# Patient Record
Sex: FEMALE | Race: WHITE | Employment: OTHER | URBAN - NONMETROPOLITAN AREA
[De-identification: names, ages, dates, MRNs, and addresses within clinical notes are randomized per-mention and may not be internally consistent; named-entity substitution may affect disease eponyms.]

---

## 2017-10-20 ENCOUNTER — NURSE TRIAGE (OUTPATIENT)
Dept: ADMINISTRATIVE | Age: 77
End: 2017-10-20

## 2017-10-20 NOTE — TELEPHONE ENCOUNTER
10/20/2017 Grandson call not son? They kept changing it. Annabel Ortiz just got out of hospital after having stent put in.  They told her not to take her eliquis.  She took the eliquise and wants to know what to do. Francesca is the pt. Having been discharge after stent placement, having accidentally taken her elequis tonight. Suggested we need a drKayy's input on the issue tonight. Is going to call [facility], try to speak to dr on call, if not able to, is then going to call PCP, dr. Camarillo. No triage    Reason for Disposition   Caller has URGENT medication question about med that PCP prescribed and triager unable to answer question    Answer Assessment - Initial Assessment Questions  1. SYMPTOMS: \"Do you have any symptoms? \"      Yes, took Elequis. 2. SEVERITY: If symptoms are present, ask \"Are they mild, moderate or severe? \"      Was told to hold the medication, and pt.  Accidentally took it    Protocols used: MEDICATION QUESTION CALL-ADULT-

## 2018-02-16 LAB
BUN BLDV-MCNC: 33 MG/DL
CALCIUM SERPL-MCNC: 8.7 MG/DL
CHLORIDE BLD-SCNC: 109 MMOL/L
CO2: 28 MMOL/L
CREAT SERPL-MCNC: 1.82 MG/DL
GFR CALCULATED: 26
GLUCOSE BLD-MCNC: 105 MG/DL
MAGNESIUM: 1.9 MG/DL
POTASSIUM SERPL-SCNC: 3.1 MMOL/L
SODIUM BLD-SCNC: 145 MMOL/L

## 2018-03-23 LAB
C-REACTIVE PROTEIN: 0.5
URIC ACID: 6.4

## 2018-05-16 LAB
BUN BLDV-MCNC: 39 MG/DL
CALCIUM SERPL-MCNC: 8.7 MG/DL
CHLORIDE BLD-SCNC: 104 MMOL/L
CO2: 28 MMOL/L
CREAT SERPL-MCNC: 1.68 MG/DL
GFR CALCULATED: 29
GLUCOSE BLD-MCNC: 100 MG/DL
POTASSIUM SERPL-SCNC: 4 MMOL/L
SODIUM BLD-SCNC: 142 MMOL/L

## 2018-10-31 ENCOUNTER — OFFICE VISIT (OUTPATIENT)
Dept: NEPHROLOGY | Age: 78
End: 2018-10-31
Payer: COMMERCIAL

## 2018-10-31 VITALS
BODY MASS INDEX: 28.12 KG/M2 | WEIGHT: 175 LBS | OXYGEN SATURATION: 97 % | HEIGHT: 66 IN | HEART RATE: 76 BPM | RESPIRATION RATE: 18 BRPM | DIASTOLIC BLOOD PRESSURE: 65 MMHG | SYSTOLIC BLOOD PRESSURE: 106 MMHG

## 2018-10-31 DIAGNOSIS — N18.30 CKD (CHRONIC KIDNEY DISEASE), STAGE III (HCC): Primary | ICD-10-CM

## 2018-10-31 PROBLEM — K21.9 ACID REFLUX: Status: ACTIVE | Noted: 2018-10-31

## 2018-10-31 PROBLEM — I10 HYPERTENSION: Status: ACTIVE | Noted: 2018-10-31

## 2018-10-31 PROBLEM — C50.919 MALIGNANT NEOPLASM OF FEMALE BREAST (HCC): Status: ACTIVE | Noted: 2018-10-31

## 2018-10-31 PROBLEM — I48.91 ATRIAL FIBRILLATION (HCC): Status: ACTIVE | Noted: 2018-10-31

## 2018-10-31 PROCEDURE — 1090F PRES/ABSN URINE INCON ASSESS: CPT | Performed by: INTERNAL MEDICINE

## 2018-10-31 PROCEDURE — 99203 OFFICE O/P NEW LOW 30 MIN: CPT | Performed by: INTERNAL MEDICINE

## 2018-10-31 PROCEDURE — 1036F TOBACCO NON-USER: CPT | Performed by: INTERNAL MEDICINE

## 2018-10-31 PROCEDURE — 1123F ACP DISCUSS/DSCN MKR DOCD: CPT | Performed by: INTERNAL MEDICINE

## 2018-10-31 PROCEDURE — 1101F PT FALLS ASSESS-DOCD LE1/YR: CPT | Performed by: INTERNAL MEDICINE

## 2018-10-31 PROCEDURE — G8419 CALC BMI OUT NRM PARAM NOF/U: HCPCS | Performed by: INTERNAL MEDICINE

## 2018-10-31 PROCEDURE — G8484 FLU IMMUNIZE NO ADMIN: HCPCS | Performed by: INTERNAL MEDICINE

## 2018-10-31 PROCEDURE — G8427 DOCREV CUR MEDS BY ELIG CLIN: HCPCS | Performed by: INTERNAL MEDICINE

## 2018-10-31 PROCEDURE — G8400 PT W/DXA NO RESULTS DOC: HCPCS | Performed by: INTERNAL MEDICINE

## 2018-10-31 PROCEDURE — G8598 ASA/ANTIPLAT THER USED: HCPCS | Performed by: INTERNAL MEDICINE

## 2018-10-31 PROCEDURE — 4040F PNEUMOC VAC/ADMIN/RCVD: CPT | Performed by: INTERNAL MEDICINE

## 2018-10-31 RX ORDER — PANTOPRAZOLE SODIUM 40 MG/1
40 TABLET, DELAYED RELEASE ORAL DAILY
COMMUNITY
End: 2021-08-02 | Stop reason: ALTCHOICE

## 2018-10-31 RX ORDER — PNV NO.95/FERROUS FUM/FOLIC AC 28MG-0.8MG
TABLET ORAL DAILY
COMMUNITY
End: 2020-03-02

## 2018-10-31 RX ORDER — ISOSORBIDE MONONITRATE 30 MG/1
30 TABLET, EXTENDED RELEASE ORAL DAILY
COMMUNITY

## 2018-10-31 RX ORDER — CLOPIDOGREL BISULFATE 75 MG/1
75 TABLET ORAL DAILY
COMMUNITY

## 2018-10-31 RX ORDER — ALLOPURINOL 100 MG/1
100 TABLET ORAL DAILY
COMMUNITY
End: 2019-02-04 | Stop reason: ALTCHOICE

## 2018-10-31 RX ORDER — SUCRALFATE ORAL 1 G/10ML
1 SUSPENSION ORAL 4 TIMES DAILY
COMMUNITY
End: 2020-03-02

## 2018-10-31 RX ORDER — METOPROLOL SUCCINATE 25 MG/1
25 TABLET, EXTENDED RELEASE ORAL DAILY
COMMUNITY

## 2018-10-31 RX ORDER — FUROSEMIDE 20 MG/1
20 TABLET ORAL DAILY
Status: ON HOLD | COMMUNITY
End: 2022-06-28 | Stop reason: HOSPADM

## 2018-10-31 RX ORDER — ATORVASTATIN CALCIUM 10 MG/1
10 TABLET, FILM COATED ORAL EVERY EVENING
COMMUNITY

## 2018-10-31 RX ORDER — HYDROXYCHLOROQUINE SULFATE 200 MG/1
200 TABLET, FILM COATED ORAL 2 TIMES DAILY
COMMUNITY
End: 2020-03-02

## 2018-10-31 RX ORDER — LISINOPRIL 2.5 MG/1
2.5 TABLET ORAL DAILY
COMMUNITY
End: 2022-02-14

## 2019-01-24 LAB
ABSOLUTE BASO #: 0 /CMM (ref 0–200)
ABSOLUTE EOS #: 200 /CMM (ref 0–500)
ABSOLUTE LYMPH #: 1200 /CMM (ref 1000–4800)
ABSOLUTE MONO #: 500 /CMM (ref 0–800)
ABSOLUTE NEUT #: 3800 /CMM (ref 1800–7700)
ANION GAP SERPL CALCULATED.3IONS-SCNC: 9 MMOL/L (ref 4–12)
BASOPHILS RELATIVE PERCENT: 0.6 % (ref 0–2)
BUN BLDV-MCNC: 30 MG/DL (ref 7–20)
CALCIUM SERPL-MCNC: 9.2 MG/DL (ref 8.8–10.5)
CHLORIDE BLD-SCNC: 103 MEQ/L (ref 101–111)
CO2: 28 MEQ/L (ref 21–32)
CREAT SERPL-MCNC: 1.45 MG/DL (ref 0.6–1.3)
CREATININE CLEARANCE: 35
EOSINOPHILS RELATIVE PERCENT: 3 % (ref 0–6)
GLUCOSE: 80 MG/DL (ref 70–110)
HCT VFR BLD CALC: 34.5 % (ref 35–44)
HEMOGLOBIN: 11.4 GM/DL (ref 12–15)
LYMPHOCYTES RELATIVE PERCENT: 21.2 % (ref 15–45)
MCH RBC QN AUTO: 32.2 PG (ref 27.5–33)
MCHC RBC AUTO-ENTMCNC: 33.2 GM/DL (ref 33–36)
MCV RBC AUTO: 97 CU MIC (ref 80–97)
MONOCYTES RELATIVE PERCENT: 8.6 % (ref 2–10)
NEUTROPHILS RELATIVE PERCENT: 66.6 % (ref 40–70)
NUCLEATED RBCS: 0 /100 WBC
PDW BLD-RTO: 13.5 % (ref 12–16)
PLATELET # BLD: 203 TH/CMM (ref 150–400)
POTASSIUM SERPL-SCNC: 4.3 MEQ/L (ref 3.6–5)
RBC # BLD: 3.56 MIL/CMM (ref 4–5.1)
SODIUM BLD-SCNC: 140 MEQ/L (ref 135–145)
WBC # BLD: 5.8 TH/CMM (ref 4.4–10.5)

## 2019-02-04 ENCOUNTER — OFFICE VISIT (OUTPATIENT)
Dept: NEPHROLOGY | Age: 79
End: 2019-02-04
Payer: MEDICARE

## 2019-02-04 VITALS
SYSTOLIC BLOOD PRESSURE: 138 MMHG | BODY MASS INDEX: 28.45 KG/M2 | HEART RATE: 67 BPM | RESPIRATION RATE: 18 BRPM | WEIGHT: 177 LBS | DIASTOLIC BLOOD PRESSURE: 78 MMHG | OXYGEN SATURATION: 98 % | HEIGHT: 66 IN

## 2019-02-04 DIAGNOSIS — N18.30 CKD (CHRONIC KIDNEY DISEASE), STAGE III (HCC): Primary | ICD-10-CM

## 2019-02-04 PROCEDURE — 99213 OFFICE O/P EST LOW 20 MIN: CPT | Performed by: INTERNAL MEDICINE

## 2019-05-23 LAB
ABSOLUTE BASO #: 100 /CMM (ref 0–200)
ABSOLUTE EOS #: 300 /CMM (ref 0–500)
ABSOLUTE LYMPH #: 1100 /CMM (ref 1000–4800)
ABSOLUTE MONO #: 700 /CMM (ref 0–800)
ABSOLUTE NEUT #: 4500 /CMM (ref 1800–7700)
ANION GAP SERPL CALCULATED.3IONS-SCNC: 8 MMOL/L (ref 4–12)
BASOPHILS RELATIVE PERCENT: 0.8 % (ref 0–2)
BUN BLDV-MCNC: 32 MG/DL (ref 7–20)
CALCIUM SERPL-MCNC: 9.1 MG/DL (ref 8.8–10.5)
CHLORIDE BLD-SCNC: 106 MEQ/L (ref 101–111)
CO2: 28 MEQ/L (ref 21–32)
CREAT SERPL-MCNC: 1.4 MG/DL (ref 0.6–1.3)
CREATININE CLEARANCE: 36
EOSINOPHILS RELATIVE PERCENT: 4 % (ref 0–6)
GLUCOSE: 91 MG/DL (ref 70–110)
HCT VFR BLD CALC: 35.9 % (ref 35–44)
HEMOGLOBIN: 11.8 GM/DL (ref 12–15)
LYMPHOCYTES RELATIVE PERCENT: 16.3 % (ref 15–45)
MCH RBC QN AUTO: 31.4 PG (ref 27.5–33)
MCHC RBC AUTO-ENTMCNC: 33 GM/DL (ref 33–36)
MCV RBC AUTO: 95.2 CU MIC (ref 80–97)
MONOCYTES RELATIVE PERCENT: 11.2 % (ref 2–10)
NEUTROPHILS RELATIVE PERCENT: 67.7 % (ref 40–70)
NUCLEATED RBCS: 0.3 /100 WBC
PDW BLD-RTO: 12.9 % (ref 12–16)
PLATELET # BLD: 211 TH/CMM (ref 150–400)
POTASSIUM SERPL-SCNC: 4.6 MEQ/L (ref 3.6–5)
RBC # BLD: 3.78 MIL/CMM (ref 4–5.1)
SODIUM BLD-SCNC: 142 MEQ/L (ref 135–145)
WBC # BLD: 6.6 TH/CMM (ref 4.4–10.5)

## 2019-06-03 ENCOUNTER — OFFICE VISIT (OUTPATIENT)
Dept: NEPHROLOGY | Age: 79
End: 2019-06-03
Payer: MEDICARE

## 2019-06-03 VITALS
DIASTOLIC BLOOD PRESSURE: 78 MMHG | OXYGEN SATURATION: 96 % | WEIGHT: 179 LBS | HEIGHT: 66 IN | SYSTOLIC BLOOD PRESSURE: 132 MMHG | RESPIRATION RATE: 18 BRPM | BODY MASS INDEX: 28.77 KG/M2 | HEART RATE: 66 BPM

## 2019-06-03 DIAGNOSIS — M54.32 SCIATICA OF LEFT SIDE: ICD-10-CM

## 2019-06-03 DIAGNOSIS — N18.30 CKD (CHRONIC KIDNEY DISEASE), STAGE III (HCC): Primary | ICD-10-CM

## 2019-06-03 PROCEDURE — 99213 OFFICE O/P EST LOW 20 MIN: CPT | Performed by: INTERNAL MEDICINE

## 2019-06-03 RX ORDER — PREDNISONE 10 MG/1
10 TABLET ORAL DAILY
Refills: 0 | COMMUNITY
Start: 2019-05-23 | End: 2019-06-19 | Stop reason: SDUPTHER

## 2019-06-03 RX ORDER — ZINC GLUCONATE 50 MG
50 TABLET ORAL DAILY
COMMUNITY
End: 2020-03-02

## 2019-06-03 RX ORDER — METHYLPREDNISOLONE 4 MG/1
TABLET ORAL
Qty: 1 KIT | Refills: 1 | Status: SHIPPED | OUTPATIENT
Start: 2019-06-03 | End: 2019-06-09

## 2019-06-03 NOTE — PROGRESS NOTES
tablet Take 10 mg by mouth every evening      hydroxychloroquine (PLAQUENIL) 200 MG tablet Take 200 mg by mouth 2 times daily      Potassium Chloride Victoria ER (K-DUR PO) Take 10 mEq by mouth daily      sucralfate (CARAFATE) 1 GM/10ML suspension Take 1 g by mouth 4 times daily      Ferrous Sulfate (IRON) 325 (65 Fe) MG TABS Take by mouth 2 times daily      pantoprazole (PROTONIX) 40 MG tablet Take 40 mg by mouth daily      predniSONE (DELTASONE) 10 MG tablet 10 mg daily  0     No current facility-administered medications for this visit. IMPRESSIONS:        Kidney disease: CKD stage IIIA-stable  Anemia: stable  Bone and mineral metabolism: stable  Probable sciatica       PLAN:  1. We discussed the eGFR today. 2. We will continue all current medications without changes. 3. Medrol dosepak  4. We will see the patient back in 0.25 months.               _________________________________  Ivis Bennett.  [facility]      Concepcion Del Rosario MD

## 2019-06-19 ENCOUNTER — OFFICE VISIT (OUTPATIENT)
Dept: NEPHROLOGY | Age: 79
End: 2019-06-19
Payer: MEDICARE

## 2019-06-19 VITALS
RESPIRATION RATE: 18 BRPM | HEART RATE: 87 BPM | OXYGEN SATURATION: 97 % | BODY MASS INDEX: 27.8 KG/M2 | DIASTOLIC BLOOD PRESSURE: 76 MMHG | WEIGHT: 173 LBS | SYSTOLIC BLOOD PRESSURE: 126 MMHG | HEIGHT: 66 IN

## 2019-06-19 DIAGNOSIS — N18.30 CKD (CHRONIC KIDNEY DISEASE), STAGE III (HCC): Primary | ICD-10-CM

## 2019-06-19 PROCEDURE — 99213 OFFICE O/P EST LOW 20 MIN: CPT | Performed by: INTERNAL MEDICINE

## 2019-06-19 RX ORDER — PREDNISONE 1 MG/1
10 TABLET ORAL DAILY
Qty: 60 TABLET | Refills: 0 | Status: SHIPPED | OUTPATIENT
Start: 2019-06-19 | End: 2019-06-29

## 2019-06-19 NOTE — PROGRESS NOTES
[facility]    [address]  [address], [address]  [phone]       Progress Note    6/19/2019 11:29 AM    Pt Name:    Ashia Dubon  YOB: 1940  Primary Care Physician:  Andi Orosco MD       Chief Complaint:   Chief Complaint   Patient presents with    Chronic Kidney Disease     iii    Hypertension        History of Chief Complaint: CKD stage IIIB from HTN and non function of the right kidney. Subjective:  I last saw the patient in clinic 06/03/19. I follow the patient for Chronic Kidney disease stage IIIB. Since our last visit the patient has not been hospitalized. The patient is sleeping well at night with 1-2 times per night nocturia. The patient has a good appetite and is remaining active. The patient denied N/V/C/D/SOB/CP. At our last visit she complained of numbness and weakness of the left leg. She responded nicely to medrol dosepak. Her numbness is returning. Last six eGFR readings:  Lab Results   Component Value Date    LABGLOM 29 05/16/2018    LABGLOM 26 02/16/2018          Objective:  VITALS:  /76 (Site: Right Upper Arm, Position: Sitting, Cuff Size: Small Adult)   Pulse 87   Resp 18   Ht 5' 5.5\" (1.664 m)   Wt 173 lb (78.5 kg)   SpO2 97%   BMI 28.35 kg/m²   Weight:   Wt Readings from Last 3 Encounters:   06/19/19 173 lb (78.5 kg)   06/03/19 179 lb (81.2 kg)   02/04/19 177 lb (80.3 kg)     Body mass index is 28.35 kg/m². Physical examination    General:  Alert and cooperative with exam  HEENT:  Normocephalic. No lesions nor rashes. JATIN. EOMI  Neck:   No JVD and no bruits. Thyroid gland is normal  Lungs:  Breathing easily. No rales nor rhonchi. No cough nor sputum production. Heart[de-identified]            RRR. No murmurs nor rubs. PMI is not enlarged nor displaced. Abdomen:  Soft and non tender. Bowel sounds are active in all four quadrants. Extremities:  No edema  Neurologic:  CN II-XII are intact. No deficits noted. Muscle strength and tone are equal throughout. Skin:                Warm and dry with no rashes. Muscles:         Hand  and leg strength are equal and strong bilaterally.      Lab Data      CBC:   Lab Results   Component Value Date    WBC 6.6 05/23/2019    HGB 11.8 (L) 05/23/2019    HCT 35.9 05/23/2019    MCV 95.2 05/23/2019     05/23/2019     BMP:    Lab Results   Component Value Date     05/23/2019     01/24/2019     05/16/2018    K 4.6 05/23/2019    K 4.3 01/24/2019    K 4.0 05/16/2018     05/23/2019     01/24/2019     05/16/2018    CO2 28 05/23/2019    CO2 28 01/24/2019    CO2 28 05/16/2018    BUN 32 (H) 05/23/2019    BUN 30 (H) 01/24/2019    BUN 39 05/16/2018    CREATININE 1.40 (H) 05/23/2019    CREATININE 1.45 (H) 01/24/2019    CREATININE 1.68 05/16/2018    GLUCOSE 91 05/23/2019    GLUCOSE 80 01/24/2019    GLUCOSE 100 05/16/2018      Hepatic: No results found for: AST, ALT, ALB, BILITOT, ALKPHOS  BNP: No results found for: BNP  Lipids: No results found for: CHOL, HDL  INR: No results found for: INR  URINE: No results found for: NAUR, PROTUR  No results found for: NITRU, COLORU, PHUR, LABCAST, WBCUA, RBCUA, MUCUS, TRICHOMONAS, YEAST, BACTERIA, CLARITYU, SPECGRAV, LEUKOCYTESUR, UROBILINOGEN, BILIRUBINUR, BLOODU, GLUCOSEU, KETUA, AMORPHOUS   Microalbumen/Creatinine ratio:  No components found for: RUCREAT        Medications:    Current Outpatient Medications   Medication Sig Dispense Refill    zinc gluconate 50 MG tablet Take 50 mg by mouth daily      predniSONE (DELTASONE) 10 MG tablet 10 mg daily  0    metoprolol succinate (TOPROL XL) 25 MG extended release tablet Take 25 mg by mouth daily      clopidogrel (PLAVIX) 75 MG tablet Take 75 mg by mouth daily      isosorbide mononitrate (IMDUR) 30 MG extended release tablet Take 30 mg by mouth daily      apixaban (ELIQUIS) 2.5 MG TABS tablet Take 2.5 mg by mouth 2 times daily      furosemide (LASIX) 20 MG tablet Take 20 mg by mouth daily      lisinopril (PRINIVIL;ZESTRIL) 2.5 MG tablet Take 2.5 mg by mouth daily      atorvastatin (LIPITOR) 10 MG tablet Take 10 mg by mouth every evening      hydroxychloroquine (PLAQUENIL) 200 MG tablet Take 200 mg by mouth 2 times daily      Potassium Chloride Victoria ER (K-DUR PO) Take 10 mEq by mouth daily      sucralfate (CARAFATE) 1 GM/10ML suspension Take 1 g by mouth 4 times daily      Ferrous Sulfate (IRON) 325 (65 Fe) MG TABS Take by mouth 2 times daily      pantoprazole (PROTONIX) 40 MG tablet Take 40 mg by mouth daily       No current facility-administered medications for this visit. IMPRESSIONS:        Kidney disease: CKD stage IIIA-stable  Lumbar sciatica  Anemia:  Bone and mineral metabolism:       PLAN:  1. We discussed the eGFR today. 2. We will continue all current medications without changes. 3. Will add prednisone 10 mg oral daily until she is seen by her family physician 06/28/19  4. We will see the patient back in 3 months.               _________________________________  Raina Greenberg.  [facility]      Gina Sheth MD

## 2019-09-10 LAB
ANION GAP SERPL CALCULATED.3IONS-SCNC: 6 MMOL/L (ref 4–12)
BUN BLDV-MCNC: 30 MG/DL (ref 7–20)
CALCIUM SERPL-MCNC: 9.3 MG/DL (ref 8.8–10.5)
CHLORIDE BLD-SCNC: 108 MEQ/L (ref 101–111)
CO2: 29 MEQ/L (ref 21–32)
CREAT SERPL-MCNC: 1.65 MG/DL (ref 0.6–1.3)
CREATININE CLEARANCE: 30
GLUCOSE, FASTING: 93 MG/DL (ref 70–110)
POTASSIUM SERPL-SCNC: 4.2 MEQ/L (ref 3.6–5)
SODIUM BLD-SCNC: 143 MEQ/L (ref 135–145)

## 2019-09-17 LAB
ABSOLUTE BASO #: 100 /CMM (ref 0–200)
ABSOLUTE EOS #: 200 /CMM (ref 0–500)
ABSOLUTE LYMPH #: 900 /CMM (ref 1000–4800)
ABSOLUTE MONO #: 500 /CMM (ref 0–800)
ABSOLUTE NEUT #: 3500 /CMM (ref 1800–7700)
BASOPHILS RELATIVE PERCENT: 1 % (ref 0–2)
EOSINOPHILS RELATIVE PERCENT: 3 % (ref 0–6)
HCT VFR BLD CALC: 35.3 % (ref 35–44)
HEMOGLOBIN: 11.5 GM/DL (ref 12–15)
LYMPHOCYTES RELATIVE PERCENT: 17.1 % (ref 15–45)
MCH RBC QN AUTO: 31 PG (ref 27.5–33)
MCHC RBC AUTO-ENTMCNC: 32.4 GM/DL (ref 33–36)
MCV RBC AUTO: 95.8 CU MIC (ref 80–97)
MONOCYTES RELATIVE PERCENT: 10.5 % (ref 2–10)
NEUTROPHILS RELATIVE PERCENT: 68.4 % (ref 40–70)
NUCLEATED RBCS: 0.2 /100 WBC
PDW BLD-RTO: 13.3 % (ref 12–16)
PLATELET # BLD: 197 TH/CMM (ref 150–400)
RBC # BLD: 3.69 MIL/CMM (ref 4–5.1)
WBC # BLD: 5.2 TH/CMM (ref 4.4–10.5)

## 2019-09-18 ENCOUNTER — OFFICE VISIT (OUTPATIENT)
Dept: NEPHROLOGY | Age: 79
End: 2019-09-18
Payer: MEDICARE

## 2019-09-18 VITALS
DIASTOLIC BLOOD PRESSURE: 68 MMHG | BODY MASS INDEX: 27.32 KG/M2 | RESPIRATION RATE: 18 BRPM | WEIGHT: 170 LBS | HEART RATE: 63 BPM | SYSTOLIC BLOOD PRESSURE: 112 MMHG | OXYGEN SATURATION: 97 % | HEIGHT: 66 IN

## 2019-09-18 DIAGNOSIS — N18.30 CKD (CHRONIC KIDNEY DISEASE), STAGE III (HCC): Primary | ICD-10-CM

## 2019-09-18 PROCEDURE — 99213 OFFICE O/P EST LOW 20 MIN: CPT | Performed by: INTERNAL MEDICINE

## 2019-09-18 RX ORDER — OMEPRAZOLE 40 MG/1
40 CAPSULE, DELAYED RELEASE ORAL DAILY
COMMUNITY
Start: 2017-03-01 | End: 2019-09-18 | Stop reason: ALTCHOICE

## 2019-09-18 NOTE — PROGRESS NOTES
[facility]    [address]  [address], [address]  [phone]       Progress Note    9/18/2019 11:35 AM    Pt Name:    Rene Ashford  YOB: 1940  Primary Care Physician:  Mike Lu MD       Chief Complaint:   Chief Complaint   Patient presents with    Chronic Kidney Disease     iii    Hypertension        History of Chief Complaint: CKD stage IIIB from HTN and non function of the right kidney. Subjective:  I last saw the patient in clinic 06/19/19. I follow the patient for Chronic Kidney disease stage IIIB. Since our last visit the patient has not been hospitalized. The patient is sleeping fairly well at night with 1-2 times per night nocturia. The patient has a good appetite and is remaining active. The patient denied N/V/C/D/SOB/CP. Last six eGFR readings:  Lab Results   Component Value Date    LABGLOM 29 05/16/2018    LABGLOM 26 02/16/2018          Objective:  VITALS:  /68 (Site: Right Upper Arm, Position: Sitting, Cuff Size: Large Adult)   Pulse 63   Resp 18   Ht 5' 5.5\" (1.664 m)   Wt 170 lb (77.1 kg)   SpO2 97%   BMI 27.86 kg/m²   Weight:   Wt Readings from Last 3 Encounters:   09/18/19 170 lb (77.1 kg)   06/19/19 173 lb (78.5 kg)   06/03/19 179 lb (81.2 kg)     Body mass index is 27.86 kg/m². Physical examination    General:  Alert and cooperative with exam  HEENT:  Normocephalic. No lesions nor rashes. JATIN. EOMI  Neck:   No JVD and no bruits. Thyroid gland is normal  Lungs:  Breathing easily. No rales nor rhonchi. No cough nor sputum production. Heart[de-identified]            RRR. No murmurs nor rubs. PMI is not enlarged nor displaced. Abdomen:  Soft and non tender. Bowel sounds are active in all four quadrants. Extremities:  No edema  Neurologic:  CN II-XII are intact. No deficits noted. Muscle strength and tone are equal throughout. Skin:                Warm and dry with no rashes.   Muscles:         Hand  and leg

## 2020-01-08 LAB
ABSOLUTE BASO #: 0 /CMM (ref 0–200)
ABSOLUTE EOS #: 200 /CMM (ref 0–500)
ABSOLUTE LYMPH #: 800 /CMM (ref 1000–4800)
ABSOLUTE MONO #: 500 /CMM (ref 0–800)
ABSOLUTE NEUT #: 4000 /CMM (ref 1800–7700)
BASOPHILS RELATIVE PERCENT: 0.9 % (ref 0–2)
EOSINOPHILS RELATIVE PERCENT: 3.5 % (ref 0–6)
HCT VFR BLD CALC: 31.8 % (ref 35–44)
HEMOGLOBIN: 10.4 GM/DL (ref 12–15)
LYMPHOCYTES RELATIVE PERCENT: 13.9 % (ref 15–45)
MCH RBC QN AUTO: 30.9 PG (ref 27.5–33)
MCHC RBC AUTO-ENTMCNC: 32.8 GM/DL (ref 33–36)
MCV RBC AUTO: 94.2 CU MIC (ref 80–97)
MONOCYTES RELATIVE PERCENT: 9.2 % (ref 2–10)
NEUTROPHILS RELATIVE PERCENT: 72.5 % (ref 40–70)
NUCLEATED RBCS: 0.1 /100 WBC
PDW BLD-RTO: 14.3 % (ref 12–16)
PLATELET # BLD: 260 TH/CMM (ref 150–400)
RBC # BLD: 3.37 MIL/CMM (ref 4–5.1)
WBC # BLD: 5.5 TH/CMM (ref 4.4–10.5)

## 2020-01-09 LAB
ANION GAP SERPL CALCULATED.3IONS-SCNC: 5 MMOL/L (ref 4–12)
BUN BLDV-MCNC: 32 MG/DL (ref 7–20)
CALCIUM SERPL-MCNC: 9.1 MG/DL (ref 8.8–10.5)
CHLORIDE BLD-SCNC: 108 MEQ/L (ref 101–111)
CO2: 31 MEQ/L (ref 21–32)
CREAT SERPL-MCNC: 1.52 MG/DL (ref 0.6–1.3)
CREATININE CLEARANCE: 33
GLUCOSE: 87 MG/DL (ref 70–110)
POTASSIUM SERPL-SCNC: 3.8 MEQ/L (ref 3.6–5)
SODIUM BLD-SCNC: 144 MEQ/L (ref 135–145)

## 2020-01-15 ENCOUNTER — OFFICE VISIT (OUTPATIENT)
Dept: NEPHROLOGY | Age: 80
End: 2020-01-15
Payer: MEDICARE

## 2020-01-15 VITALS
WEIGHT: 171 LBS | SYSTOLIC BLOOD PRESSURE: 148 MMHG | HEART RATE: 72 BPM | DIASTOLIC BLOOD PRESSURE: 78 MMHG | RESPIRATION RATE: 18 BRPM | HEIGHT: 66 IN | OXYGEN SATURATION: 97 % | BODY MASS INDEX: 27.48 KG/M2

## 2020-01-15 PROCEDURE — 1123F ACP DISCUSS/DSCN MKR DOCD: CPT | Performed by: INTERNAL MEDICINE

## 2020-01-15 PROCEDURE — G8417 CALC BMI ABV UP PARAM F/U: HCPCS | Performed by: INTERNAL MEDICINE

## 2020-01-15 PROCEDURE — G8484 FLU IMMUNIZE NO ADMIN: HCPCS | Performed by: INTERNAL MEDICINE

## 2020-01-15 PROCEDURE — 99213 OFFICE O/P EST LOW 20 MIN: CPT | Performed by: INTERNAL MEDICINE

## 2020-01-15 PROCEDURE — 1036F TOBACCO NON-USER: CPT | Performed by: INTERNAL MEDICINE

## 2020-01-15 PROCEDURE — G8427 DOCREV CUR MEDS BY ELIG CLIN: HCPCS | Performed by: INTERNAL MEDICINE

## 2020-01-15 PROCEDURE — 1090F PRES/ABSN URINE INCON ASSESS: CPT | Performed by: INTERNAL MEDICINE

## 2020-01-15 PROCEDURE — 4040F PNEUMOC VAC/ADMIN/RCVD: CPT | Performed by: INTERNAL MEDICINE

## 2020-01-15 PROCEDURE — G8400 PT W/DXA NO RESULTS DOC: HCPCS | Performed by: INTERNAL MEDICINE

## 2020-01-15 RX ORDER — OMEPRAZOLE 40 MG/1
CAPSULE, DELAYED RELEASE ORAL DAILY
COMMUNITY
End: 2020-03-02

## 2020-01-15 RX ORDER — METHYLPREDNISOLONE 4 MG/1
TABLET ORAL
Qty: 1 KIT | Refills: 1 | Status: SHIPPED | OUTPATIENT
Start: 2020-01-15 | End: 2020-01-21

## 2020-01-15 NOTE — PROGRESS NOTES
Warm and dry with no rashes. Muscles:         Hand  and leg strength are equal and strong bilaterally.      Lab Data      CBC:   Lab Results   Component Value Date    WBC 5.5 01/08/2020    HGB 10.4 (L) 01/08/2020    HCT 31.8 (L) 01/08/2020    MCV 94.2 01/08/2020     01/08/2020     BMP:    Lab Results   Component Value Date     01/08/2020     09/10/2019     05/23/2019    K 3.8 01/08/2020    K 4.2 09/10/2019    K 4.6 05/23/2019     01/08/2020     09/10/2019     05/23/2019    CO2 31 01/08/2020    CO2 29 09/10/2019    CO2 28 05/23/2019    BUN 32 (H) 01/08/2020    BUN 30 (H) 09/10/2019    BUN 32 (H) 05/23/2019    CREATININE 1.52 (H) 01/08/2020    CREATININE 1.65 (H) 09/10/2019    CREATININE 1.40 (H) 05/23/2019    GLUCOSE 87 01/08/2020    GLUCOSE 91 05/23/2019    GLUCOSE 80 01/24/2019      Hepatic: No results found for: AST, ALT, ALB, BILITOT, ALKPHOS  BNP: No results found for: BNP  Lipids: No results found for: CHOL, HDL  INR: No results found for: INR  URINE: No results found for: NAUR, PROTUR  No results found for: NITRU, COLORU, PHUR, LABCAST, WBCUA, RBCUA, MUCUS, TRICHOMONAS, YEAST, BACTERIA, CLARITYU, SPECGRAV, LEUKOCYTESUR, UROBILINOGEN, BILIRUBINUR, BLOODU, GLUCOSEU, KETUA, AMORPHOUS   Microalbumen/Creatinine ratio:  No components found for: RUCREAT        Medications:    Current Outpatient Medications   Medication Sig Dispense Refill    omeprazole (PRILOSEC) 40 MG delayed release capsule Take by mouth daily      metoprolol succinate (TOPROL XL) 25 MG extended release tablet Take 25 mg by mouth daily      clopidogrel (PLAVIX) 75 MG tablet Take 75 mg by mouth daily      isosorbide mononitrate (IMDUR) 30 MG extended release tablet Take 30 mg by mouth daily      apixaban (ELIQUIS) 2.5 MG TABS tablet Take 2.5 mg by mouth 2 times daily      furosemide (LASIX) 20 MG tablet Take 20 mg by mouth daily      lisinopril (PRINIVIL;ZESTRIL) 2.5 MG tablet Take 2.5 mg by mouth daily      atorvastatin (LIPITOR) 10 MG tablet Take 10 mg by mouth every evening      Potassium Chloride Victoria ER (K-DUR PO) Take 10 mEq by mouth daily      Ferrous Sulfate (IRON) 325 (65 Fe) MG TABS Take by mouth daily       pantoprazole (PROTONIX) 40 MG tablet Take 40 mg by mouth daily      LACTULOSE PO TAKE 15 MILLILITERS BY MOUTH DAILY AS DIRECTED      zinc gluconate 50 MG tablet Take 50 mg by mouth daily      hydroxychloroquine (PLAQUENIL) 200 MG tablet Take 200 mg by mouth 2 times daily      sucralfate (CARAFATE) 1 GM/10ML suspension Take 1 g by mouth 4 times daily       No current facility-administered medications for this visit. IMPRESSIONS:        Kidney disease: CKD stage IIIB  Migratory joint pain  Anemia: Anemia remains stable. No need for an erythrocyte stimulating agent (KENJI). Bone and mineral metabolism: stable       PLAN:  1. We discussed the eGFR today. 2. We will continue all current medications without changes. 3. Checking GUDELIA and anti DS DNA to screen for lupus. 4. Medrol dose shani for pain  5. We will see the patient back in 1.5 months.               _________________________________  Eliverto Holes.  [facility]      Stan Austin MD

## 2020-02-24 LAB
ABSOLUTE BASO #: 0 /CMM (ref 0–200)
ABSOLUTE EOS #: 100 /CMM (ref 0–500)
ABSOLUTE LYMPH #: 1000 /CMM (ref 1000–4800)
ABSOLUTE MONO #: 500 /CMM (ref 0–800)
ABSOLUTE NEUT #: 5600 /CMM (ref 1800–7700)
ANION GAP SERPL CALCULATED.3IONS-SCNC: 9 MMOL/L (ref 4–12)
BASOPHILS RELATIVE PERCENT: 0.5 % (ref 0–2)
BUN BLDV-MCNC: 35 MG/DL (ref 7–20)
CALCIUM SERPL-MCNC: 9.1 MG/DL (ref 8.8–10.5)
CHLORIDE BLD-SCNC: 105 MEQ/L (ref 101–111)
CO2: 30 MEQ/L (ref 21–32)
CREAT SERPL-MCNC: 1.46 MG/DL (ref 0.6–1.3)
CREATININE CLEARANCE: 34
EOSINOPHILS RELATIVE PERCENT: 1 % (ref 0–6)
GLUCOSE: 106 MG/DL (ref 70–110)
HCT VFR BLD CALC: 35.9 % (ref 35–44)
HEMOGLOBIN: 11.8 GM/DL (ref 12–15)
LYMPHOCYTES RELATIVE PERCENT: 13.3 % (ref 15–45)
MCH RBC QN AUTO: 30.5 PG (ref 27.5–33)
MCHC RBC AUTO-ENTMCNC: 32.8 GM/DL (ref 33–36)
MCV RBC AUTO: 93.2 CU MIC (ref 80–97)
MONOCYTES RELATIVE PERCENT: 7.4 % (ref 2–10)
NEUTROPHILS RELATIVE PERCENT: 77.8 % (ref 40–70)
NUCLEATED RBCS: 0.3 /100 WBC
PDW BLD-RTO: 13.6 % (ref 12–16)
PLATELET # BLD: 276 TH/CMM (ref 150–400)
POTASSIUM SERPL-SCNC: 4 MEQ/L (ref 3.6–5)
RBC # BLD: 3.85 MIL/CMM (ref 4–5.1)
SODIUM BLD-SCNC: 144 MEQ/L (ref 135–145)
WBC # BLD: 7.2 TH/CMM (ref 4.4–10.5)

## 2020-02-26 LAB — DOUBLE STRANDED DNA AB, IGG: <12.3 IU/ML

## 2020-03-01 LAB
ANA PATTERN: NORMAL
ANA SCREEN: POSITIVE
ANA TITER: NORMAL

## 2020-03-02 ENCOUNTER — OFFICE VISIT (OUTPATIENT)
Dept: NEPHROLOGY | Age: 80
End: 2020-03-02
Payer: MEDICARE

## 2020-03-02 VITALS
BODY MASS INDEX: 27.88 KG/M2 | SYSTOLIC BLOOD PRESSURE: 106 MMHG | HEIGHT: 65 IN | HEART RATE: 72 BPM | WEIGHT: 167.3 LBS | DIASTOLIC BLOOD PRESSURE: 70 MMHG

## 2020-03-02 PROCEDURE — G8400 PT W/DXA NO RESULTS DOC: HCPCS | Performed by: INTERNAL MEDICINE

## 2020-03-02 PROCEDURE — G8427 DOCREV CUR MEDS BY ELIG CLIN: HCPCS | Performed by: INTERNAL MEDICINE

## 2020-03-02 PROCEDURE — 1036F TOBACCO NON-USER: CPT | Performed by: INTERNAL MEDICINE

## 2020-03-02 PROCEDURE — 4040F PNEUMOC VAC/ADMIN/RCVD: CPT | Performed by: INTERNAL MEDICINE

## 2020-03-02 PROCEDURE — 1123F ACP DISCUSS/DSCN MKR DOCD: CPT | Performed by: INTERNAL MEDICINE

## 2020-03-02 PROCEDURE — G8417 CALC BMI ABV UP PARAM F/U: HCPCS | Performed by: INTERNAL MEDICINE

## 2020-03-02 PROCEDURE — 99213 OFFICE O/P EST LOW 20 MIN: CPT | Performed by: INTERNAL MEDICINE

## 2020-03-02 PROCEDURE — 1090F PRES/ABSN URINE INCON ASSESS: CPT | Performed by: INTERNAL MEDICINE

## 2020-03-02 PROCEDURE — G8484 FLU IMMUNIZE NO ADMIN: HCPCS | Performed by: INTERNAL MEDICINE

## 2020-03-02 NOTE — PROGRESS NOTES
[facility]    [address]  [address], [address]  [phone]       Progress Note    3/2/2020 2:49 PM    Pt Name:    Godwin Munson:    1940  Primary Care Physician:  Ricardo Franco MD       Chief Complaint:   Chief Complaint   Patient presents with    Follow-up        History of Chief Complaint: CKD stage IIIB from HTN and non function of the right kidney. Subjective:  I last saw the patient in clinic 01/15/20. I follow the patient for Chronic Kidney disease stage IIIB. Since our last visit the patient has not been hospitalized. The patient is sleeping well at night with 1-2 times per night nocturia. The patient has a good appetite and is remaining active. The patient denied N/V/C/D/SOB/CP. Medrol dosepak did help the pain. GUDELIA was 1:160. Anti DS DNA was normal.      Last six eGFR readings:  Lab Results   Component Value Date    LABGLOM 29 05/16/2018    LABGLOM 26 02/16/2018          Objective:  VITALS:  /70 (Site: Right Upper Arm, Position: Sitting, Cuff Size: Medium Adult)   Pulse 72   Ht 5' 5\" (1.651 m)   Wt 167 lb 4.8 oz (75.9 kg)   BMI 27.84 kg/m²   Weight:   Wt Readings from Last 3 Encounters:   03/02/20 167 lb 4.8 oz (75.9 kg)   01/15/20 171 lb (77.6 kg)   09/18/19 170 lb (77.1 kg)     Body mass index is 27.84 kg/m². Physical examination    General:  Alert and cooperative with exam  HEENT:  Normocephalic. No lesions nor rashes. JATIN. EOMI  Neck:   No JVD and no bruits. Thyroid gland is normal  Lungs:  Breathing easily. No rales nor rhonchi. No cough nor sputum production. Heart[de-identified]            RRR. No murmurs nor rubs. PMI is not enlarged nor displaced. Abdomen:  Soft and non tender. Bowel sounds are active in all four quadrants. Extremities:  No edema  Neurologic:  CN II-XII are intact. No deficits noted. Muscle strength and tone are equal throughout. Skin:                Warm and dry with no rashes.   Muscles:         Hand  and leg strength are equal and strong bilaterally.      Lab Data      CBC:   Lab Results   Component Value Date    WBC 7.2 02/24/2020    HGB 11.8 (L) 02/24/2020    HCT 35.9 02/24/2020    MCV 93.2 02/24/2020     02/24/2020     BMP:    Lab Results   Component Value Date     02/24/2020     01/08/2020     09/10/2019    K 4.0 02/24/2020    K 3.8 01/08/2020    K 4.2 09/10/2019     02/24/2020     01/08/2020     09/10/2019    CO2 30 02/24/2020    CO2 31 01/08/2020    CO2 29 09/10/2019    BUN 35 (H) 02/24/2020    BUN 32 (H) 01/08/2020    BUN 30 (H) 09/10/2019    CREATININE 1.46 (H) 02/24/2020    CREATININE 1.52 (H) 01/08/2020    CREATININE 1.65 (H) 09/10/2019    GLUCOSE 106 02/24/2020    GLUCOSE 87 01/08/2020    GLUCOSE 91 05/23/2019      Hepatic: No results found for: AST, ALT, ALB, BILITOT, ALKPHOS  BNP: No results found for: BNP  Lipids: No results found for: CHOL, HDL  INR: No results found for: INR  URINE: No results found for: NAUR, PROTUR  No results found for: NITRU, COLORU, PHUR, LABCAST, WBCUA, RBCUA, MUCUS, TRICHOMONAS, YEAST, BACTERIA, CLARITYU, SPECGRAV, LEUKOCYTESUR, UROBILINOGEN, BILIRUBINUR, BLOODU, GLUCOSEU, KETUA, AMORPHOUS   Microalbumen/Creatinine ratio:  No components found for: RUCREAT        Medications:    Current Outpatient Medications   Medication Sig Dispense Refill    LACTULOSE PO TAKE 15 MILLILITERS BY MOUTH DAILY AS DIRECTED      metoprolol succinate (TOPROL XL) 25 MG extended release tablet Take 25 mg by mouth daily      clopidogrel (PLAVIX) 75 MG tablet Take 75 mg by mouth daily      isosorbide mononitrate (IMDUR) 30 MG extended release tablet Take 30 mg by mouth daily      apixaban (ELIQUIS) 2.5 MG TABS tablet Take 2.5 mg by mouth 2 times daily      furosemide (LASIX) 20 MG tablet Take 20 mg by mouth daily      lisinopril (PRINIVIL;ZESTRIL) 2.5 MG tablet Take 2.5 mg by mouth daily      atorvastatin (LIPITOR) 10 MG tablet Take 10 mg by mouth every evening      pantoprazole (PROTONIX) 40 MG tablet Take 40 mg by mouth daily       No current facility-administered medications for this visit. IMPRESSIONS:        Kidney disease: CKD stage IIIB with improved function. Anemia: Anemia remains stable. No need for an erythrocyte stimulating agent (KENJI). Bone and mineral metabolism: stable       PLAN:  1. We discussed the eGFR today. 2. We will continue all current medications without changes. 3. Would suggest stopping protonix. Continued use of proton pump inhibitors have been shown to slow kidney function. 4. We will see the patient back in 3 months.               _________________________________  Real Riff.  [facility]      Kyle Ramirez MD

## 2020-09-28 LAB
ABSOLUTE BASO #: 100 /CMM (ref 0–200)
ABSOLUTE EOS #: 100 /CMM (ref 0–500)
ABSOLUTE LYMPH #: 1300 /CMM (ref 1000–4800)
ABSOLUTE MONO #: 800 /CMM (ref 0–800)
ABSOLUTE NEUT #: 5400 /CMM (ref 1800–7700)
ANION GAP SERPL CALCULATED.3IONS-SCNC: 8 MMOL/L (ref 4–12)
BASOPHILS RELATIVE PERCENT: 1 % (ref 0–2)
BUN BLDV-MCNC: 31 MG/DL (ref 7–20)
CALCIUM SERPL-MCNC: 9.5 MG/DL (ref 8.8–10.5)
CHLORIDE BLD-SCNC: 107 MEQ/L (ref 101–111)
CO2: 31 MEQ/L (ref 21–32)
CREAT SERPL-MCNC: 1.4 MG/DL (ref 0.6–1.3)
CREATININE CLEARANCE: 36
EOSINOPHILS RELATIVE PERCENT: 1.4 % (ref 0–6)
GLUCOSE, FASTING: 89 MG/DL (ref 70–110)
HCT VFR BLD CALC: 33.6 % (ref 35–44)
HEMOGLOBIN: 11.1 GM/DL (ref 12–15)
LYMPHOCYTES RELATIVE PERCENT: 16.5 % (ref 15–45)
MCH RBC QN AUTO: 30 PG (ref 27.5–33)
MCHC RBC AUTO-ENTMCNC: 33.2 GM/DL (ref 33–36)
MCV RBC AUTO: 90.3 CU MIC (ref 80–97)
MONOCYTES RELATIVE PERCENT: 10.3 % (ref 2–10)
NEUTROPHILS RELATIVE PERCENT: 70.8 % (ref 40–70)
NUCLEATED RBCS: 0.1 /100 WBC
PDW BLD-RTO: 15.6 % (ref 12–16)
PLATELET # BLD: 265 TH/CMM (ref 150–400)
POTASSIUM SERPL-SCNC: 3.8 MEQ/L (ref 3.6–5)
RBC # BLD: 3.72 MIL/CMM (ref 4–5.1)
SODIUM BLD-SCNC: 146 MEQ/L (ref 135–145)
WBC # BLD: 7.7 TH/CMM (ref 4.4–10.5)

## 2020-10-05 ENCOUNTER — OFFICE VISIT (OUTPATIENT)
Dept: NEPHROLOGY | Age: 80
End: 2020-10-05
Payer: MEDICARE

## 2020-10-05 VITALS
HEART RATE: 70 BPM | SYSTOLIC BLOOD PRESSURE: 132 MMHG | OXYGEN SATURATION: 96 % | WEIGHT: 170 LBS | HEIGHT: 65 IN | RESPIRATION RATE: 18 BRPM | TEMPERATURE: 97.7 F | BODY MASS INDEX: 28.32 KG/M2 | DIASTOLIC BLOOD PRESSURE: 86 MMHG

## 2020-10-05 PROCEDURE — G8484 FLU IMMUNIZE NO ADMIN: HCPCS | Performed by: INTERNAL MEDICINE

## 2020-10-05 PROCEDURE — G8427 DOCREV CUR MEDS BY ELIG CLIN: HCPCS | Performed by: INTERNAL MEDICINE

## 2020-10-05 PROCEDURE — 1090F PRES/ABSN URINE INCON ASSESS: CPT | Performed by: INTERNAL MEDICINE

## 2020-10-05 PROCEDURE — 1123F ACP DISCUSS/DSCN MKR DOCD: CPT | Performed by: INTERNAL MEDICINE

## 2020-10-05 PROCEDURE — 1036F TOBACCO NON-USER: CPT | Performed by: INTERNAL MEDICINE

## 2020-10-05 PROCEDURE — G8417 CALC BMI ABV UP PARAM F/U: HCPCS | Performed by: INTERNAL MEDICINE

## 2020-10-05 PROCEDURE — 99213 OFFICE O/P EST LOW 20 MIN: CPT | Performed by: INTERNAL MEDICINE

## 2020-10-05 PROCEDURE — G8400 PT W/DXA NO RESULTS DOC: HCPCS | Performed by: INTERNAL MEDICINE

## 2020-10-05 PROCEDURE — 4040F PNEUMOC VAC/ADMIN/RCVD: CPT | Performed by: INTERNAL MEDICINE

## 2020-10-05 RX ORDER — PREDNISONE 1 MG/1
5 TABLET ORAL DAILY
COMMUNITY
End: 2021-02-02 | Stop reason: SDUPTHER

## 2020-10-05 NOTE — PROGRESS NOTES
[facility]    [address]  [address], [address]  [phone]       Progress Note    10/5/2020 4:58 PM    Pt Name:    Franco Hernández:    1940  Primary Care Physician:  Iván Cartwright MD       Chief Complaint:   Chief Complaint   Patient presents with    Chronic Kidney Disease    Hypertension    Other     non functional right kidney        History of Chief Complaint: CKD stage G-IIIB-A1 from HTN and non function of the right kidney. Subjective:  I last saw the patient in clinic 03/02/2020. I follow the patient for Chronic Kidney disease stage G-IIIB-A1. Since our last visit the patient has not been hospitalized. The patient is sleeping well at night with 1-2 times per night nocturia. The patient has a good appetite and is remaining active. The patient denied N/V/C/D/SOB/CP. She has no trouble at bladder emptying. Her ears feel plugged up. COVID-19 screening  Fever: none  Cough: none  Exposure: none  Shortness of breath: none    Protein/creatinine ratio:   eGFR: 36 Ml/min      Last six eGFR readings:  Lab Results   Component Value Date    LABGLOM 29 05/16/2018    LABGLOM 26 02/16/2018          Objective:  VITALS:  /86 (Site: Right Upper Arm, Position: Sitting, Cuff Size: Small Adult)   Pulse 70   Temp 97.7 °F (36.5 °C)   Resp 18   Ht 5' 5\" (1.651 m)   Wt 170 lb (77.1 kg)   SpO2 96%   BMI 28.29 kg/m²   Weight:   Wt Readings from Last 3 Encounters:   10/05/20 170 lb (77.1 kg)   03/02/20 167 lb 4.8 oz (75.9 kg)   01/15/20 171 lb (77.6 kg)     Body mass index is 28.29 kg/m². Physical examination    General:  Alert and cooperative with exam  HEENT:  Normocephalic. No lesions nor rashes. JATIN. EOMI  Neck:   No JVD and no bruits. Thyroid gland is normal  Lungs:  Breathing easily. No rales nor rhonchi. No cough nor sputum production. Heart[de-identified]            RRR. No murmurs nor rubs. PMI is not enlarged nor displaced.   Abdomen:  Soft and non

## 2020-10-05 NOTE — PATIENT INSTRUCTIONS

## 2020-12-31 LAB
ABSOLUTE BASO #: 100 /CMM (ref 0–200)
ABSOLUTE EOS #: 200 /CMM (ref 0–500)
ABSOLUTE LYMPH #: 1200 /CMM (ref 1000–4800)
ABSOLUTE MONO #: 800 /CMM (ref 0–800)
ABSOLUTE NEUT #: 6000 /CMM (ref 1800–7700)
ANION GAP SERPL CALCULATED.3IONS-SCNC: 5 MMOL/L (ref 4–12)
BASOPHILS RELATIVE PERCENT: 0.8 % (ref 0–2)
BUN BLDV-MCNC: 35 MG/DL (ref 7–20)
CALCIUM SERPL-MCNC: 9.1 MG/DL (ref 8.8–10.5)
CHLORIDE BLD-SCNC: 107 MEQ/L (ref 101–111)
CO2: 30 MEQ/L (ref 21–32)
CREAT SERPL-MCNC: 1.55 MG/DL (ref 0.6–1.3)
CREATININE CLEARANCE: 32
EOSINOPHILS RELATIVE PERCENT: 1.9 % (ref 0–6)
GLUCOSE: 91 MG/DL (ref 70–110)
HCT VFR BLD CALC: 35.6 % (ref 35–44)
HEMOGLOBIN: 11.6 GM/DL (ref 12–15)
LYMPHOCYTES RELATIVE PERCENT: 14.8 % (ref 15–45)
MCH RBC QN AUTO: 29.7 PG (ref 27.5–33)
MCHC RBC AUTO-ENTMCNC: 32.5 GM/DL (ref 33–36)
MCV RBC AUTO: 91.4 CU MIC (ref 80–97)
MONOCYTES RELATIVE PERCENT: 9.4 % (ref 2–10)
NEUTROPHILS RELATIVE PERCENT: 73.1 % (ref 40–70)
NUCLEATED RBCS: 0.1 /100 WBC
PDW BLD-RTO: 14.4 % (ref 12–16)
PLATELET # BLD: 219 TH/CMM (ref 150–400)
POTASSIUM SERPL-SCNC: 3.6 MEQ/L (ref 3.6–5)
RBC # BLD: 3.9 MIL/CMM (ref 4–5.1)
SODIUM BLD-SCNC: 142 MEQ/L (ref 135–145)
WBC # BLD: 8.2 TH/CMM (ref 4.4–10.5)

## 2021-01-04 ENCOUNTER — OFFICE VISIT (OUTPATIENT)
Dept: NEPHROLOGY | Age: 81
End: 2021-01-04
Payer: MEDICARE

## 2021-01-04 VITALS
WEIGHT: 176 LBS | HEART RATE: 62 BPM | BODY MASS INDEX: 29.32 KG/M2 | SYSTOLIC BLOOD PRESSURE: 118 MMHG | OXYGEN SATURATION: 98 % | RESPIRATION RATE: 18 BRPM | DIASTOLIC BLOOD PRESSURE: 84 MMHG | HEIGHT: 65 IN

## 2021-01-04 DIAGNOSIS — N18.32 STAGE 3B CHRONIC KIDNEY DISEASE (HCC): Primary | ICD-10-CM

## 2021-01-04 PROCEDURE — G8428 CUR MEDS NOT DOCUMENT: HCPCS | Performed by: INTERNAL MEDICINE

## 2021-01-04 PROCEDURE — 1123F ACP DISCUSS/DSCN MKR DOCD: CPT | Performed by: INTERNAL MEDICINE

## 2021-01-04 PROCEDURE — 1090F PRES/ABSN URINE INCON ASSESS: CPT | Performed by: INTERNAL MEDICINE

## 2021-01-04 PROCEDURE — 1036F TOBACCO NON-USER: CPT | Performed by: INTERNAL MEDICINE

## 2021-01-04 PROCEDURE — G8484 FLU IMMUNIZE NO ADMIN: HCPCS | Performed by: INTERNAL MEDICINE

## 2021-01-04 PROCEDURE — 4040F PNEUMOC VAC/ADMIN/RCVD: CPT | Performed by: INTERNAL MEDICINE

## 2021-01-04 PROCEDURE — G8417 CALC BMI ABV UP PARAM F/U: HCPCS | Performed by: INTERNAL MEDICINE

## 2021-01-04 PROCEDURE — G8400 PT W/DXA NO RESULTS DOC: HCPCS | Performed by: INTERNAL MEDICINE

## 2021-01-04 PROCEDURE — 99214 OFFICE O/P EST MOD 30 MIN: CPT | Performed by: INTERNAL MEDICINE

## 2021-01-04 RX ORDER — LANOLIN ALCOHOL/MO/W.PET/CERES
3 CREAM (GRAM) TOPICAL DAILY
COMMUNITY
End: 2021-08-02 | Stop reason: ALTCHOICE

## 2021-01-04 NOTE — PATIENT INSTRUCTIONS

## 2021-01-04 NOTE — PROGRESS NOTES
[facility]    [address] [facility] [address] [facility] [address]  [address], [address]  [phone]       Progress Note    1/4/2021 2:37 PM    Pt Name:    April Meyer:    1940  Primary Care Physician:  Chintan Lai MD       Chief Complaint:   Chief Complaint   Patient presents with    Chronic Kidney Disease    Anemia    Hypertension    Other     non function of the right kidney        History of Chief Complaint: CKD stage G-IIIB-A1 from HTN and non function of the right kidney. Subjective:  I last saw the patient in clinic 10/05/2020. I follow the patient for Chronic Kidney disease stage G-IIIB-A1. Since our last visit the patient has not been hospitalized. The patient is sleeping well at night with 1-2 times per night nocturia. The patient has a good appetite and is remaining active. The patient denied N/V/C/D/SOB/CP. She has no trouble at bladder emptying. COVID-19 screening  Fever: none  Cough: none  Exposure: none  Shortness of breath: none    Protein/creatinine ratio:   eGFR: 32 Ml/min      Last six eGFR readings:  Lab Results   Component Value Date    LABGLOM 29 05/16/2018    LABGLOM 26 02/16/2018          Objective:  VITALS:  /84 (Site: Right Upper Arm, Position: Sitting, Cuff Size: Small Adult)   Pulse 62   Resp 18   Ht 5' 5\" (1.651 m)   Wt 176 lb (79.8 kg)   SpO2 98%   BMI 29.29 kg/m²   Weight:   Wt Readings from Last 3 Encounters:   01/04/21 176 lb (79.8 kg)   10/05/20 170 lb (77.1 kg)   03/02/20 167 lb 4.8 oz (75.9 kg)     Body mass index is 29.29 kg/m². Physical examination    General:  Alert and cooperative with exam  HEENT:  Normocephalic. No lesions nor rashes. JATIN. EOMI  Neck:   No JVD and no bruits. Thyroid gland is normal  Lungs:  Breathing easily. No rales nor rhonchi. No cough nor sputum production. Heart[de-identified]            RRR. No murmurs nor rubs. PMI is not enlarged nor displaced. Abdomen:  Soft and non tender.  Bowel sounds are active in all four quadrants. Extremities:  No edema  Neurologic:  CN II-XII are intact. No deficits noted. Muscle strength and tone are equal throughout. Skin:                Warm and dry with no rashes. Muscles:         Hand  and leg strength are equal and strong bilaterally.      Lab Data      CBC:   Lab Results   Component Value Date    WBC 8.2 12/31/2020    HGB 11.6 (L) 12/31/2020    HCT 35.6 12/31/2020    MCV 91.4 12/31/2020     12/31/2020     BMP:    Lab Results   Component Value Date     12/31/2020     (H) 09/28/2020     02/24/2020    K 3.6 12/31/2020    K 3.8 09/28/2020    K 4.0 02/24/2020     12/31/2020     09/28/2020     02/24/2020    CO2 30 12/31/2020    CO2 31 09/28/2020    CO2 30 02/24/2020    BUN 35 (H) 12/31/2020    BUN 31 (H) 09/28/2020    BUN 35 (H) 02/24/2020    CREATININE 1.55 (H) 12/31/2020    CREATININE 1.40 (H) 09/28/2020    CREATININE 1.46 (H) 02/24/2020    GLUCOSE 91 12/31/2020    GLUCOSE 106 02/24/2020    GLUCOSE 87 01/08/2020      Hepatic: No results found for: AST, ALT, ALB, BILITOT, ALKPHOS  BNP: No results found for: BNP  Lipids: No results found for: CHOL, HDL  INR: No results found for: INR  URINE: No results found for: NAUR, PROTUR  No results found for: NITRU, COLORU, PHUR, LABCAST, WBCUA, RBCUA, MUCUS, TRICHOMONAS, YEAST, BACTERIA, CLARITYU, SPECGRAV, LEUKOCYTESUR, UROBILINOGEN, BILIRUBINUR, BLOODU, GLUCOSEU, KETUA, AMORPHOUS   Microalbumen/Creatinine ratio:  No components found for: RUCREAT        Medications:    Current Outpatient Medications   Medication Sig Dispense Refill    melatonin 3 MG TABS tablet Take 3 mg by mouth daily      metoprolol succinate (TOPROL XL) 25 MG extended release tablet Take 25 mg by mouth daily      clopidogrel (PLAVIX) 75 MG tablet Take 75 mg by mouth daily      isosorbide mononitrate (IMDUR) 30 MG extended release tablet Take 30 mg by mouth daily      apixaban (ELIQUIS) 2.5 MG TABS tablet Take 2.5 mg by mouth 2 times daily      furosemide (LASIX) 20 MG tablet Take 20 mg by mouth daily      lisinopril (PRINIVIL;ZESTRIL) 2.5 MG tablet Take 2.5 mg by mouth daily      atorvastatin (LIPITOR) 10 MG tablet Take 10 mg by mouth every evening      pantoprazole (PROTONIX) 40 MG tablet Take 40 mg by mouth daily      predniSONE (DELTASONE) 5 MG tablet Take 5 mg by mouth daily      LACTULOSE PO TAKE 15 MILLILITERS BY MOUTH DAILY AS DIRECTED       No current facility-administered medications for this visit. IMPRESSIONS:      Kidney disease: CKD stage G-IIIB-A1  Anemia: Anemia remains stable. No need for an erythrocyte stimulating agent (KENJI). Bone and mineral metabolism: There is no complaint of bone pain. PLAN:  1. We discussed the eGFR today. 2. We will continue all current medications without changes. 3. We will see the patient back in 4 months.       _________________________________  Baltazar Moses.  [facility]      Stacia Mcelroy MD

## 2021-01-26 ENCOUNTER — OFFICE VISIT (OUTPATIENT)
Dept: RHEUMATOLOGY | Age: 81
End: 2021-01-26
Payer: MEDICARE

## 2021-01-26 VITALS
TEMPERATURE: 97.3 F | DIASTOLIC BLOOD PRESSURE: 72 MMHG | OXYGEN SATURATION: 96 % | SYSTOLIC BLOOD PRESSURE: 144 MMHG | HEART RATE: 80 BPM | BODY MASS INDEX: 28.76 KG/M2 | HEIGHT: 65 IN | WEIGHT: 172.6 LBS

## 2021-01-26 DIAGNOSIS — Z87.39 H/O RHEUMATOID ARTHRITIS: Primary | ICD-10-CM

## 2021-01-26 DIAGNOSIS — R53.83 OTHER FATIGUE: ICD-10-CM

## 2021-01-26 DIAGNOSIS — M15.9 OSTEOARTHRITIS OF MULTIPLE JOINTS, UNSPECIFIED OSTEOARTHRITIS TYPE: ICD-10-CM

## 2021-01-26 DIAGNOSIS — G89.29 CHRONIC BILATERAL LOW BACK PAIN WITHOUT SCIATICA: ICD-10-CM

## 2021-01-26 DIAGNOSIS — M54.50 CHRONIC BILATERAL LOW BACK PAIN WITHOUT SCIATICA: ICD-10-CM

## 2021-01-26 PROCEDURE — G8417 CALC BMI ABV UP PARAM F/U: HCPCS | Performed by: NURSE PRACTITIONER

## 2021-01-26 PROCEDURE — G8427 DOCREV CUR MEDS BY ELIG CLIN: HCPCS | Performed by: NURSE PRACTITIONER

## 2021-01-26 PROCEDURE — G8400 PT W/DXA NO RESULTS DOC: HCPCS | Performed by: NURSE PRACTITIONER

## 2021-01-26 PROCEDURE — 1036F TOBACCO NON-USER: CPT | Performed by: NURSE PRACTITIONER

## 2021-01-26 PROCEDURE — 4040F PNEUMOC VAC/ADMIN/RCVD: CPT | Performed by: NURSE PRACTITIONER

## 2021-01-26 PROCEDURE — 1090F PRES/ABSN URINE INCON ASSESS: CPT | Performed by: NURSE PRACTITIONER

## 2021-01-26 PROCEDURE — 1123F ACP DISCUSS/DSCN MKR DOCD: CPT | Performed by: NURSE PRACTITIONER

## 2021-01-26 PROCEDURE — 99204 OFFICE O/P NEW MOD 45 MIN: CPT | Performed by: NURSE PRACTITIONER

## 2021-01-26 PROCEDURE — G8484 FLU IMMUNIZE NO ADMIN: HCPCS | Performed by: NURSE PRACTITIONER

## 2021-01-26 ASSESSMENT — ENCOUNTER SYMPTOMS
EYE ITCHING: 0
COUGH: 0
DIARRHEA: 0
ABDOMINAL PAIN: 0
CONSTIPATION: 0
BACK PAIN: 1
SHORTNESS OF BREATH: 1
NAUSEA: 0
TROUBLE SWALLOWING: 0
EYE PAIN: 0

## 2021-01-26 NOTE — PROGRESS NOTES
[facility]       Date Of Service: 1/26/2021  Provider: Ronaldo Fonseca CNP    Name: Anjel Jensen   MRN: [id number]    Chief Complaint(s)      Chief Complaint   Patient presents with   [address] [place] [address] Patient     referral per Deborah  RA        History of Present illness (HPI)    Anjel Jensen   is a(n)80 y.o. female with a hx of HTN, hypercholesteremia, CVA, a fib, gout, anemia, rheumatoid arthritis, CAD s/p stenting, GERD, osteoarthritis, allergic rhinitis, cholelithiasis, hiatal hernia, diverticulosis, HLD, CKD stage III, hyperlipidemia, GI bleed, breast cancer referred by Lorena Pacheco MD for evaluation of rheumatoid arthritis. Symptoms started: 3-4 years ago with bilateral hand and feet pain and swelling which progressed to more widespread joint pain. Saw Dr. Inna Clarke who diagnosed her with rheumatoid arthritis. Started on hydroxychloroquine 200 mg BID (stopped due to eye issues). Transferred care to Dr. Bing King in [place], [place] and methotrexate was started and stopped due to side effects (heartburn). Negative CCP, + RF 1:2 (2018)    The patient reports pain affecting her hands, elbows, shoulders, back, hips, knees, ankles, feet   Pain on a scale 0-10: 6/10  Type of pain: constant   Timing:  Night time   Aggravating factors: n/a   Alleviating factors: OTC gels, creams (some relief)   Current therapy: none   Previous therapy:plaquenil 200 mg BID (eye issues), methotrexate (heartburn)    Associated symptoms:  denies swelling/  redness/  warmth  + AM stiffness lasting all day  + gelling    -denies Photosenstivity, Rash, dry mouth/dry eyes, oral/nasal sores, Raynaud's, digital ulcerations, skin tightening, renal disease,foamy urination, hematuria, sz's, blood clots, pre-term labor loss, AIHA,leukpenia/lymphopenia, thrombocytopenia, hair loss, serositis.      - denies enthesitis, nail changes, hx of STD,  personal or family history of Psoriatic arthritis, psoriasis, ank spond,     + Rfl:     clopidogrel (PLAVIX) 75 MG tablet, Take 75 mg by mouth daily, Disp: , Rfl:     isosorbide mononitrate (IMDUR) 30 MG extended release tablet, Take 30 mg by mouth daily, Disp: , Rfl:     apixaban (ELIQUIS) 2.5 MG TABS tablet, Take 2.5 mg by mouth 2 times daily, Disp: , Rfl:     furosemide (LASIX) 20 MG tablet, Take 20 mg by mouth daily, Disp: , Rfl:     lisinopril (PRINIVIL;ZESTRIL) 2.5 MG tablet, Take 2.5 mg by mouth daily, Disp: , Rfl:     atorvastatin (LIPITOR) 10 MG tablet, Take 10 mg by mouth every evening, Disp: , Rfl:     pantoprazole (PROTONIX) 40 MG tablet, Take 40 mg by mouth daily, Disp: , Rfl:     LACTULOSE PO, TAKE 15 MILLILITERS BY MOUTH DAILY AS DIRECTED, Disp: , Rfl:     PHYSICAL EXAMINATION / OBJECTIVE     Objective:  BP (!) 144/72 (Site: Right Upper Arm, Position: Sitting, Cuff Size: Medium Adult)   Pulse 80   Temp 97.3 °F (36.3 °C)   Ht 5' 5\" (1.651 m)   Wt 172 lb 9.6 oz (78.3 kg)   SpO2 96%   BMI 28.72 kg/m²     General Appearance:   alert and oriented to person, place and time well-developed and well nourished  Physch : appropriate affects ,   Head:  Normocephalic and atraumatic  Eyes: No gross abnormalities. , PERRL, EOMI, Sclera nonicteric, conjunctiva non-icteric. ENT:  MMM,  no deformities , NO oral/nasal sores, Non-tender sinuses. Neck:  Neck supple, Non-tender, No  mass, thyromegaly,    Lymph:  No cervical  or  supraclavicular lymph node swelling.     Pulmonary/Chest:  CTA bilat. , normal air movement, no respiratory distress  Cardiovascular:  Normal rate, + S1 and S2,  NO murmurs , rubs, gallups,    no edema  :  Deferred   Abd/GI: Deferred   Neurologic:  gait and coordination normal and speech normal  Skin:  Skin color and temp ,  + toenail thickening bilateral great toe  Extremities:  No clubbing ,     Musculoskeletal:  ROM normal,     Upper extremities:    SHOULDERS+ tender bilat ,   ELBOWS nontender,no swelling,   WRISTS nontender, no swelling,   HANDS/FINGERS + tender bilat MCPs, PIPs, DIPs. + fullness bilat 2,3 MCPs, PIPs. ? circumferential swelling right 2nd finger    Lower extremities:  HIPS + tender bilateral outer hips  KNEES + tender bilat, no swelling  ANKLES + tender and swelling bilat,  + warmth left  FEET : + MTP compression bilat. Posterior heel tenderness/warmth right, + heel spurs bilat     Spine:   C-spine, T-spine & L-spine:  + tender lumbar spine,  ROM  normal ,  - shober, + yoanna (right), - Occiput to wall , SLR/Cross SLR. LABS        CBC  Lab Results   Component Value Date    WBC 8.2 12/31/2020    RBC 3.90 12/31/2020    HGB 11.6 12/31/2020    HCT 35.6 12/31/2020    MCV 91.4 12/31/2020    MCH 29.7 12/31/2020    MCHC 32.5 12/31/2020    RDW 14.4 12/31/2020     12/31/2020       CMP  Lab Results   Component Value Date    CALCIUM 9.10 12/31/2020     12/31/2020    K 3.6 12/31/2020    CO2 30 12/31/2020     12/31/2020    BUN 35 12/31/2020    CREATININE 1.55 12/31/2020       HgBA1c: No components found for: HGBA1C    No results found for: TSHFT4, TSH  No results found for: VITD25      Lab Results   Component Value Date    ANASCRN Positive (A) 02/24/2020     No results found for: SSA  No results found for: SSB  No results found for: ANTI-SMITH  No results found for: DSDNAAB   No results found for: ANTIRNP  No results found for: C3, C4  No results found for: CCPAB  No results found for: RF    No components found for: CANCASCRN, APANCASCRN  No results found for: SEDRATE  Lab Results   Component Value Date    CRP 0.5 03/23/2018         RADIOLOGY:         ASSESSMENT/PLAN    Assessment   Plan   1. H/O rheumatoid arthritis  - diagnosed 3-4 years ago by Dr. Cinthia Connell, had bilateral hands and feet swelling which progressed to generalized joint pains. Transferred care to Dr. Mariajose Valdez in [facility]. Currently not taking any medications other than prednisone 5 mg daily. Neg CCP, + RF 1:2 (2018).  Exam with tender shoulder, MCPs, PIPs, DIPs, hips, knees, ankles, MTPs. + swelling bilateral ankles. Left posterior heel tenderness. ? circumferential swelling right 2nd finger.   - prior tx: plaquenil, methotrexate (heartburn)   - concern for rheumatoid arthritis vs SpA (? Dactylitis, posterior heel tenderness, nail thickening, inflammatory arthritis)   - rechecking rheumatoid factor   - available previous records reviewed   - request records from rheumatologist in [place] [id number] reviewed   - baseline xray evaluation as below   - discussed start arava 10 mg daily if labs stable. Side effects: GI intolerance, diarrhea, alopecia, infection; weight loss, low blood counts, liver injury, ILD, oral sores, rash; RARE: neuropathy  - CBC Auto Differential; Future  - Comprehensive Metabolic Panel; Future  - C-Reactive Protein; Future  - Sedimentation Rate; Future  - XR FOOT RIGHT (MIN 3 VIEWS); Future  - XR FOOT LEFT (MIN 3 VIEWS); Future  - XR CHEST (2 VW); Future  - XR LUMBAR SPINE (2-3 VIEWS); Future  - Hepatitis Panel, Acute; Future  - Rheumatoid Factor; Future    2. Osteoarthritis of multiple joints, unspecified osteoarthritis type   - continue tylenol PRN    3. Other fatigue  - Hepatitis Panel, Acute; Future    4. Chronic bilateral low back pain without sciatica  - Denies radicular or red flag symptoms. Worse with activity. + yoanna testing right side      No follow-ups on file. Electronically signed by AJAY Mcginnis CNP on 1/26/2021 at 2:58 PM    New Prescriptions    No medications on file         The risks and benefits of my recommendations, as well as other treatment options, benefits and side effects were discussed with the patient today. Questions were answered. Thank you for allowing me to participate in the care of this patient. Please call if there are any questions.

## 2021-01-27 LAB
ALBUMIN SERPL-MCNC: 3.6 G/DL
ALP BLD-CCNC: 98 U/L
ALT SERPL-CCNC: 13 U/L
ANION GAP SERPL CALCULATED.3IONS-SCNC: 8 MMOL/L
AST SERPL-CCNC: 14 U/L
BASOPHILS ABSOLUTE: 0.05 /ΜL
BASOPHILS RELATIVE PERCENT: 0.6 %
BILIRUB SERPL-MCNC: 0.5 MG/DL (ref 0.1–1.4)
BUN BLDV-MCNC: 42 MG/DL
C-REACTIVE PROTEIN: 7.1
CALCIUM SERPL-MCNC: 8.8 MG/DL
CHLORIDE BLD-SCNC: 110 MMOL/L
CO2: 29 MMOL/L
CREAT SERPL-MCNC: 1.46 MG/DL
EOSINOPHILS ABSOLUTE: 0.2 /ΜL
EOSINOPHILS RELATIVE PERCENT: 2.5 %
GFR CALCULATED: 33
GLUCOSE BLD-MCNC: 85 MG/DL
HCT VFR BLD CALC: 38.2 % (ref 36–46)
HEMOGLOBIN: 11.7 G/DL (ref 12–16)
LYMPHOCYTES ABSOLUTE: 1.32 /ΜL
LYMPHOCYTES RELATIVE PERCENT: 16.7 %
MCH RBC QN AUTO: 29.4 PG
MCHC RBC AUTO-ENTMCNC: 30.6 G/DL
MCV RBC AUTO: 96 FL
MONOCYTES ABSOLUTE: 0.61 /ΜL
MONOCYTES RELATIVE PERCENT: 7.7 %
NEUTROPHILS ABSOLUTE: 5.69 /ΜL
NEUTROPHILS RELATIVE PERCENT: 72.2 %
PDW BLD-RTO: 13.4 %
PLATELET # BLD: 265 K/ΜL
PMV BLD AUTO: 10.1 FL
POTASSIUM SERPL-SCNC: 3.8 MMOL/L
RBC # BLD: 3.98 10^6/ΜL
RHEUMATOID FACTOR: 23.6
SEDIMENTATION RATE, ERYTHROCYTE: 29
SODIUM BLD-SCNC: 143 MMOL/L
TOTAL PROTEIN: 7.5
WBC # BLD: 7.89 10^3/ML

## 2021-02-02 DIAGNOSIS — Z87.39 H/O RHEUMATOID ARTHRITIS: ICD-10-CM

## 2021-02-02 DIAGNOSIS — Z51.81 MEDICATION MONITORING ENCOUNTER: Primary | ICD-10-CM

## 2021-02-02 NOTE — TELEPHONE ENCOUNTER
Patient's daughter Marcelle Garcia stated that they were under the impression that [facility] wanted Francesca to stay on this prescription and would be calling in a refill. She said that the pharmacy does not have this script and francesca took her last dose today 02/02/2021. If this is not able to be refilled, please contact the patient to advise.     Kayy Jarrett called requesting a refill on the following medications:  Requested Prescriptions     Pending Prescriptions Disp Refills    predniSONE (DELTASONE) 5 MG tablet       Sig: Take 1 tablet by mouth daily     Pharmacy verified:  .[facility] in [place], [place]    Date of last visit: 01/26/2021  Date of next visit (if applicable): 4/56/1128

## 2021-02-03 RX ORDER — PREDNISONE 1 MG/1
5 TABLET ORAL DAILY
Qty: 30 TABLET | Refills: 2 | Status: SHIPPED | OUTPATIENT
Start: 2021-02-03 | End: 2021-08-02 | Stop reason: ALTCHOICE

## 2021-02-03 RX ORDER — LEFLUNOMIDE 10 MG/1
10 TABLET ORAL DAILY
Qty: 30 TABLET | Refills: 0 | Status: SHIPPED | OUTPATIENT
Start: 2021-02-03 | End: 2021-08-02 | Stop reason: ALTCHOICE

## 2021-02-03 NOTE — TELEPHONE ENCOUNTER
----- Message from AJAY Munoz CNP sent at 2/3/2021 12:48 PM EST -----  Lab testing revealed a low positive rheumatoid factor. Hepatitis panel was negative. Blood counts with a stable anemia. Kidney function is also stable. Would like to start arava 10 mg po daily for the inflammatory arthritis. Side effects: GI intolerance, diarrhea, alopecia, infection; weight loss, low blood counts, liver injury, ILD, oral sores, rash; RARE: neuropathy    Need labs every 4 weeks for the first 2-3 months, then can space out to every 8-12 weeks.      If patient agreeable, will sign orders

## 2021-02-03 NOTE — TELEPHONE ENCOUNTER
----- Message from AJAY Ugarte CNP sent at 2/2/2021  4:32 PM EST -----  CXR with no significant abnormalities.

## 2021-02-03 NOTE — TELEPHONE ENCOUNTER
Spoke to JOELLE , she is willing too try arava as suggested. States she took last prednisone tab yesterday 2/2/21.

## 2021-02-03 NOTE — TELEPHONE ENCOUNTER
----- Message from AJAY Patiño CNP sent at 2/2/2021  4:34 PM EST -----  Xray lumbar spine with disc space narrowing. Please ask patient if she can obtain a copy of the disc and bring it into the office.

## 2021-02-03 NOTE — TELEPHONE ENCOUNTER
----- Message from AJAY Iyer CNP sent at 2/2/2021  4:33 PM EST -----  Xrays of the feet with heel spurring and bone thinning.

## 2021-02-09 NOTE — TELEPHONE ENCOUNTER
Patient is requesting a call back to discuss medication for arthritis. Pt states she has been calling and leaving messages without a call back. Pt states she believes your office may be waiting on a disc, but she isn't sure. Please advise.

## 2021-02-27 ENCOUNTER — HOSPITAL ENCOUNTER (OUTPATIENT)
Dept: GENERAL RADIOLOGY | Age: 81
Discharge: HOME | End: 2021-02-27

## 2021-02-27 DIAGNOSIS — Z00.6 ENCOUNTER FOR EXAMINATION FOR NORMAL COMPARISON OR CONTROL IN CLINICAL RESEARCH PROGRAM: ICD-10-CM

## 2021-03-10 ENCOUNTER — OFFICE VISIT (OUTPATIENT)
Dept: RHEUMATOLOGY | Age: 81
End: 2021-03-10
Payer: MEDICARE

## 2021-03-10 VITALS
WEIGHT: 173.04 LBS | DIASTOLIC BLOOD PRESSURE: 72 MMHG | BODY MASS INDEX: 28.83 KG/M2 | HEART RATE: 50 BPM | TEMPERATURE: 97.7 F | SYSTOLIC BLOOD PRESSURE: 118 MMHG | OXYGEN SATURATION: 92 % | HEIGHT: 65 IN

## 2021-03-10 DIAGNOSIS — M19.90 INFLAMMATORY ARTHRITIS: Primary | ICD-10-CM

## 2021-03-10 DIAGNOSIS — G89.29 CHRONIC BILATERAL LOW BACK PAIN WITHOUT SCIATICA: ICD-10-CM

## 2021-03-10 DIAGNOSIS — Z87.39 H/O RHEUMATOID ARTHRITIS: ICD-10-CM

## 2021-03-10 DIAGNOSIS — M15.9 OSTEOARTHRITIS OF MULTIPLE JOINTS, UNSPECIFIED OSTEOARTHRITIS TYPE: ICD-10-CM

## 2021-03-10 DIAGNOSIS — Z51.81 MEDICATION MONITORING ENCOUNTER: ICD-10-CM

## 2021-03-10 DIAGNOSIS — M54.50 CHRONIC BILATERAL LOW BACK PAIN WITHOUT SCIATICA: ICD-10-CM

## 2021-03-10 PROCEDURE — 4040F PNEUMOC VAC/ADMIN/RCVD: CPT | Performed by: NURSE PRACTITIONER

## 2021-03-10 PROCEDURE — G8484 FLU IMMUNIZE NO ADMIN: HCPCS | Performed by: NURSE PRACTITIONER

## 2021-03-10 PROCEDURE — G8400 PT W/DXA NO RESULTS DOC: HCPCS | Performed by: NURSE PRACTITIONER

## 2021-03-10 PROCEDURE — G8417 CALC BMI ABV UP PARAM F/U: HCPCS | Performed by: NURSE PRACTITIONER

## 2021-03-10 PROCEDURE — 96372 THER/PROPH/DIAG INJ SC/IM: CPT | Performed by: NURSE PRACTITIONER

## 2021-03-10 PROCEDURE — 1123F ACP DISCUSS/DSCN MKR DOCD: CPT | Performed by: NURSE PRACTITIONER

## 2021-03-10 PROCEDURE — 1036F TOBACCO NON-USER: CPT | Performed by: NURSE PRACTITIONER

## 2021-03-10 PROCEDURE — G8427 DOCREV CUR MEDS BY ELIG CLIN: HCPCS | Performed by: NURSE PRACTITIONER

## 2021-03-10 PROCEDURE — 99214 OFFICE O/P EST MOD 30 MIN: CPT | Performed by: NURSE PRACTITIONER

## 2021-03-10 PROCEDURE — 1090F PRES/ABSN URINE INCON ASSESS: CPT | Performed by: NURSE PRACTITIONER

## 2021-03-10 RX ORDER — METHYLPREDNISOLONE ACETATE 80 MG/ML
80 INJECTION, SUSPENSION INTRA-ARTICULAR; INTRALESIONAL; INTRAMUSCULAR; SOFT TISSUE ONCE
Status: COMPLETED | OUTPATIENT
Start: 2021-03-10 | End: 2021-03-10

## 2021-03-10 RX ADMIN — METHYLPREDNISOLONE ACETATE 80 MG: 80 INJECTION, SUSPENSION INTRA-ARTICULAR; INTRALESIONAL; INTRAMUSCULAR; SOFT TISSUE at 12:21

## 2021-03-10 ASSESSMENT — ENCOUNTER SYMPTOMS
TROUBLE SWALLOWING: 0
COUGH: 0
EYE PAIN: 0
BACK PAIN: 1
DIARRHEA: 0
EYE ITCHING: 0
SHORTNESS OF BREATH: 1
CONSTIPATION: 0
ABDOMINAL PAIN: 0
NAUSEA: 0

## 2021-03-31 ENCOUNTER — TELEPHONE (OUTPATIENT)
Dept: RHEUMATOLOGY | Age: 81
End: 2021-03-31

## 2021-04-26 LAB
ABSOLUTE BASO #: 0 /CMM (ref 0–200)
ABSOLUTE EOS #: 100 /CMM (ref 0–500)
ABSOLUTE LYMPH #: 1100 /CMM (ref 1000–4800)
ABSOLUTE MONO #: 600 /CMM (ref 0–800)
ABSOLUTE NEUT #: 5500 /CMM (ref 1800–7700)
ANION GAP SERPL CALCULATED.3IONS-SCNC: 9 MMOL/L (ref 4–12)
BASOPHILS RELATIVE PERCENT: 0.6 % (ref 0–2)
BUN BLDV-MCNC: 39 MG/DL (ref 7–20)
CALCIUM SERPL-MCNC: 9.6 MG/DL (ref 8.8–10.5)
CHLORIDE BLD-SCNC: 106 MEQ/L (ref 101–111)
CO2: 30 MEQ/L (ref 21–32)
CREAT SERPL-MCNC: 1.32 MG/DL (ref 0.6–1.3)
CREATININE CLEARANCE: 38
EOSINOPHILS RELATIVE PERCENT: 1.4 % (ref 0–6)
GLUCOSE: 82 MG/DL (ref 70–110)
HCT VFR BLD CALC: 37.1 % (ref 35–44)
HEMOGLOBIN: 12.1 GM/DL (ref 12–15)
LYMPHOCYTES RELATIVE PERCENT: 14.3 % (ref 15–45)
MCH RBC QN AUTO: 29.9 PG (ref 27.5–33)
MCHC RBC AUTO-ENTMCNC: 32.6 GM/DL (ref 33–36)
MCV RBC AUTO: 92 CU MIC (ref 80–97)
MONOCYTES RELATIVE PERCENT: 8.7 % (ref 2–10)
NEUTROPHILS RELATIVE PERCENT: 75 % (ref 40–70)
NUCLEATED RBCS: 0.1 /100 WBC
PDW BLD-RTO: 14.3 % (ref 12–16)
PLATELET # BLD: 231 TH/CMM (ref 150–400)
POTASSIUM SERPL-SCNC: 4.5 MEQ/L (ref 3.6–5)
RBC # BLD: 4.03 MIL/CMM (ref 4–5.1)
SODIUM BLD-SCNC: 145 MEQ/L (ref 135–145)
WBC # BLD: 7.4 TH/CMM (ref 4.4–10.5)

## 2021-04-28 ENCOUNTER — TELEPHONE (OUTPATIENT)
Dept: RHEUMATOLOGY | Age: 81
End: 2021-04-28

## 2021-04-28 NOTE — TELEPHONE ENCOUNTER
Patient left message stating she has an appt tomorrow but she is not coming back to our office again.  I attempted to call her to clarify; no answer and vm not set up

## 2021-06-07 ENCOUNTER — OFFICE VISIT (OUTPATIENT)
Dept: NEPHROLOGY | Age: 81
End: 2021-06-07
Payer: MEDICARE

## 2021-06-07 VITALS
SYSTOLIC BLOOD PRESSURE: 164 MMHG | WEIGHT: 175 LBS | OXYGEN SATURATION: 97 % | BODY MASS INDEX: 29.16 KG/M2 | RESPIRATION RATE: 18 BRPM | DIASTOLIC BLOOD PRESSURE: 88 MMHG | HEIGHT: 65 IN | HEART RATE: 70 BPM

## 2021-06-07 DIAGNOSIS — R68.81 EARLY SATIETY: Primary | ICD-10-CM

## 2021-06-07 DIAGNOSIS — N18.32 STAGE 3B CHRONIC KIDNEY DISEASE (HCC): ICD-10-CM

## 2021-06-07 PROCEDURE — 1123F ACP DISCUSS/DSCN MKR DOCD: CPT | Performed by: INTERNAL MEDICINE

## 2021-06-07 PROCEDURE — 99214 OFFICE O/P EST MOD 30 MIN: CPT | Performed by: INTERNAL MEDICINE

## 2021-06-07 PROCEDURE — G8417 CALC BMI ABV UP PARAM F/U: HCPCS | Performed by: INTERNAL MEDICINE

## 2021-06-07 PROCEDURE — 1090F PRES/ABSN URINE INCON ASSESS: CPT | Performed by: INTERNAL MEDICINE

## 2021-06-07 PROCEDURE — 4040F PNEUMOC VAC/ADMIN/RCVD: CPT | Performed by: INTERNAL MEDICINE

## 2021-06-07 PROCEDURE — G8427 DOCREV CUR MEDS BY ELIG CLIN: HCPCS | Performed by: INTERNAL MEDICINE

## 2021-06-07 PROCEDURE — G8400 PT W/DXA NO RESULTS DOC: HCPCS | Performed by: INTERNAL MEDICINE

## 2021-06-07 PROCEDURE — 1036F TOBACCO NON-USER: CPT | Performed by: INTERNAL MEDICINE

## 2021-06-07 NOTE — PROGRESS NOTES
[facility]    [address]  [address], [address]  [phone]       Progress Note    6/7/2021 3:02 PM    Pt Name:    Helene Frederick  YOB: 1940  Primary Care Physician:  Jhonny Cyr MD       Chief Complaint:   Chief Complaint   Patient presents with    Chronic Kidney Disease     non function of the right kidney of unknown etiology    Hypertension    Obesity        History of Chief Complaint: CKD stage G-IIIB-A1 from HTN and non function of the right kidney. Subjective:  I last saw the patient in clinic 01/04/2021. I follow the patient for Chronic Kidney disease stage G-IIIB-A1. Since our last visit the patient has not been hospitalized. The patient is sleeping well at night with 1-2 times per night nocturia. The patient has a fair appetite and is remaining active. The patient denied N/V/C/D/SOB/CP. She has no trouble at bladder emptying. She has had early satiety since fall 2020. She was treated for breast cancer in the past. She has heart burn and asked if she could take occasional omeprazole. She was given sucralfate that did not work. Dr. Giovanni Restrepo suggested a right renal artery stent. However, urology suggested against it.      COVID-19 screening  Fever: none  Cough: none  Exposure: none  Shortness of breath: none    Protein/creatinine ratio:   eGFR: 38 Ml/min (it was 32 Ml/Min last visit)  SCr: 1.32 Mg/Dl (It was 1.30 Mg/Dl last visit)      Last six eGFR readings:  Lab Results   Component Value Date    LABGLOM 33 01/27/2021    LABGLOM 29 05/16/2018    LABGLOM 26 02/16/2018          Objective:  VITALS:  BP (!) 164/88 (Site: Right Upper Arm, Position: Sitting, Cuff Size: Small Adult)   Pulse 70   Resp 18   Ht 5' 5\" (1.651 m)   Wt 175 lb (79.4 kg)   SpO2 97%   BMI 29.12 kg/m²   Weight:   Wt Readings from Last 3 Encounters:   06/07/21 175 lb (79.4 kg)   03/10/21 173 lb 0.6 oz (78.5 kg)   01/26/21 172 lb 9.6 oz (78.3 kg)     Body mass index is 29.12 kg/m². Physical examination    General:  Alert and cooperative with exam  HEENT:  Normocephalic. No lesions nor rashes. JATIN. EOMI  Neck:   No JVD and no bruits. Thyroid gland is normal  Lungs:  Breathing easily. No rales nor rhonchi. No cough nor sputum production. Heart[de-identified]            RRR. No murmurs nor rubs. PMI is not enlarged nor displaced. Abdomen:      Soft and non tender. Bowel sounds are active in all four quadrants. Extremities:  2+ edema  Neurologic:  CN II-XII are intact. No deficits noted. Muscle strength and tone are equal throughout. Skin:                Warm and dry with no rashes. Muscles:         Hand  and leg strength are equal and strong bilaterally.      Lab Data      CBC:   Lab Results   Component Value Date    WBC 7.4 04/26/2021    HGB 12.1 04/26/2021    HCT 37.1 04/26/2021    MCV 92.0 04/26/2021     04/26/2021     BMP:    Lab Results   Component Value Date     04/26/2021     01/27/2021     12/31/2020    K 4.5 04/26/2021    K 3.8 01/27/2021    K 3.6 12/31/2020     04/26/2021     01/27/2021     12/31/2020    CO2 30 04/26/2021    CO2 29 01/27/2021    CO2 30 12/31/2020    BUN 39 (H) 04/26/2021    BUN 42 01/27/2021    BUN 35 (H) 12/31/2020    CREATININE 1.32 (H) 04/26/2021    CREATININE 1.46 01/27/2021    CREATININE 1.55 (H) 12/31/2020    GLUCOSE 82 04/26/2021    GLUCOSE 85 01/27/2021    GLUCOSE 91 12/31/2020      Hepatic:   Lab Results   Component Value Date    AST 14 01/27/2021    ALT 13 01/27/2021    BILITOT 0.5 01/27/2021    ALKPHOS 98 01/27/2021     BNP: No results found for: BNP  Lipids: No results found for: CHOL, HDL  INR: No results found for: INR  URINE: No results found for: NAUR, PROTUR  No results found for: NITRU, COLORU, PHUR, LABCAST, WBCUA, RBCUA, MUCUS, TRICHOMONAS, YEAST, BACTERIA, CLARITYU, SPECGRAV, LEUKOCYTESUR, UROBILINOGEN, BILIRUBINUR, BLOODU, GLUCOSEU, KETUA, AMORPHOUS   Microalbumen/Creatinine ratio:  No components found for: RUCREAT        Medications:    Current Outpatient Medications   Medication Sig Dispense Refill    predniSONE (DELTASONE) 5 MG tablet Take 1 tablet by mouth daily 30 tablet 2    leflunomide (ARAVA) 10 MG tablet Take 1 tablet by mouth daily 30 tablet 0    melatonin 3 MG TABS tablet Take 3 mg by mouth daily      LACTULOSE PO TAKE 15 MILLILITERS BY MOUTH DAILY AS DIRECTED      metoprolol succinate (TOPROL XL) 25 MG extended release tablet Take 25 mg by mouth daily      clopidogrel (PLAVIX) 75 MG tablet Take 75 mg by mouth daily      isosorbide mononitrate (IMDUR) 30 MG extended release tablet Take 30 mg by mouth daily      apixaban (ELIQUIS) 2.5 MG TABS tablet Take 2.5 mg by mouth 2 times daily      furosemide (LASIX) 20 MG tablet Take 20 mg by mouth daily      lisinopril (PRINIVIL;ZESTRIL) 2.5 MG tablet Take 2.5 mg by mouth daily      atorvastatin (LIPITOR) 10 MG tablet Take 10 mg by mouth every evening      pantoprazole (PROTONIX) 40 MG tablet Take 40 mg by mouth daily       No current facility-administered medications for this visit. IMPRESSIONS:        Kidney disease: CKD stage G-IIIB-A1  Anemia: Anemia remains stable. No need for an erythrocyte stimulating agent (KENJI). Bone and mineral metabolism: There is no complaint of bone pain. PLAN:  1. We discussed the eGFR today. 2. We will continue all current medications without changes. 3. CT scan of the abdomen looking for a cause of early satiety. 4. We will see the patient back in 1.5 months.       _________________________________  Gabino Mills.  [facility]      Keely Hendricks MD

## 2021-06-10 LAB
ANION GAP SERPL CALCULATED.3IONS-SCNC: 10 MMOL/L (ref 4–12)
BUN BLDV-MCNC: 35 MG/DL (ref 7–20)
CALCIUM SERPL-MCNC: 9.1 MG/DL (ref 8.8–10.5)
CHLORIDE BLD-SCNC: 104 MEQ/L (ref 101–111)
CO2: 28 MEQ/L (ref 21–32)
CREAT SERPL-MCNC: 1.51 MG/DL (ref 0.6–1.3)
CREATININE CLEARANCE: 33
GLUCOSE: 107 MG/DL (ref 70–110)
POTASSIUM SERPL-SCNC: 3.8 MEQ/L (ref 3.6–5)
SODIUM BLD-SCNC: 142 MEQ/L (ref 135–145)

## 2021-06-14 ENCOUNTER — TELEPHONE (OUTPATIENT)
Dept: NEPHROLOGY | Age: 81
End: 2021-06-14

## 2021-06-14 NOTE — TELEPHONE ENCOUNTER
I called Emmett Perez back and let her know. She said okay. She will let Francesca know she is to drink plenty of fluids this afternoon.

## 2021-06-14 NOTE — TELEPHONE ENCOUNTER
Shantal Conde is at THE [facility] to get the CT ab done and GFR is 33 they sually do not inject dye enless the GFR is 45 or higher. Is it okay to give the dye?

## 2021-07-26 LAB
ABSOLUTE BASO #: 0 /CMM (ref 0–200)
ABSOLUTE EOS #: 100 /CMM (ref 0–500)
ABSOLUTE LYMPH #: 1200 /CMM (ref 1000–4800)
ABSOLUTE MONO #: 800 /CMM (ref 0–800)
ABSOLUTE NEUT #: 4700 /CMM (ref 1800–7700)
ANION GAP SERPL CALCULATED.3IONS-SCNC: 7 MMOL/L (ref 4–12)
BASOPHILS RELATIVE PERCENT: 0.4 % (ref 0–2)
BUN BLDV-MCNC: 29 MG/DL (ref 7–20)
CALCIUM SERPL-MCNC: 8.9 MG/DL (ref 8.8–10.5)
CHLORIDE BLD-SCNC: 106 MEQ/L (ref 101–111)
CO2: 30 MEQ/L (ref 21–32)
CREAT SERPL-MCNC: 1.43 MG/DL (ref 0.6–1.3)
CREATININE CLEARANCE: 35
EOSINOPHILS RELATIVE PERCENT: 1.6 % (ref 0–6)
GLUCOSE: 100 MG/DL (ref 70–110)
HCT VFR BLD CALC: 35.3 % (ref 35–44)
HEMOGLOBIN: 11.8 GM/DL (ref 12–15)
LYMPHOCYTES RELATIVE PERCENT: 17.6 % (ref 15–45)
MCH RBC QN AUTO: 31.3 PG (ref 27.5–33)
MCHC RBC AUTO-ENTMCNC: 33.5 GM/DL (ref 33–36)
MCV RBC AUTO: 93.5 CU MIC (ref 80–97)
MONOCYTES RELATIVE PERCENT: 11.6 % (ref 2–10)
NEUTROPHILS RELATIVE PERCENT: 68.8 % (ref 40–70)
NUCLEATED RBCS: 0.4 /100 WBC
PDW BLD-RTO: 14.3 % (ref 12–16)
PLATELET # BLD: 217 TH/CMM (ref 150–400)
POTASSIUM SERPL-SCNC: 4.2 MEQ/L (ref 3.6–5)
RBC # BLD: 3.78 MIL/CMM (ref 4–5.1)
SODIUM BLD-SCNC: 143 MEQ/L (ref 135–145)
WBC # BLD: 6.8 TH/CMM (ref 4.4–10.5)

## 2021-08-02 ENCOUNTER — OFFICE VISIT (OUTPATIENT)
Dept: NEPHROLOGY | Age: 81
End: 2021-08-02
Payer: MEDICARE

## 2021-08-02 VITALS
SYSTOLIC BLOOD PRESSURE: 138 MMHG | RESPIRATION RATE: 18 BRPM | WEIGHT: 164 LBS | OXYGEN SATURATION: 97 % | DIASTOLIC BLOOD PRESSURE: 72 MMHG | HEIGHT: 65 IN | HEART RATE: 63 BPM | BODY MASS INDEX: 27.32 KG/M2

## 2021-08-02 DIAGNOSIS — N18.32 STAGE 3B CHRONIC KIDNEY DISEASE (HCC): Primary | ICD-10-CM

## 2021-08-02 PROCEDURE — 99214 OFFICE O/P EST MOD 30 MIN: CPT | Performed by: INTERNAL MEDICINE

## 2021-08-02 PROCEDURE — G8417 CALC BMI ABV UP PARAM F/U: HCPCS | Performed by: INTERNAL MEDICINE

## 2021-08-02 PROCEDURE — 4040F PNEUMOC VAC/ADMIN/RCVD: CPT | Performed by: INTERNAL MEDICINE

## 2021-08-02 PROCEDURE — 1123F ACP DISCUSS/DSCN MKR DOCD: CPT | Performed by: INTERNAL MEDICINE

## 2021-08-02 PROCEDURE — G8427 DOCREV CUR MEDS BY ELIG CLIN: HCPCS | Performed by: INTERNAL MEDICINE

## 2021-08-02 PROCEDURE — 1090F PRES/ABSN URINE INCON ASSESS: CPT | Performed by: INTERNAL MEDICINE

## 2021-08-02 PROCEDURE — 1036F TOBACCO NON-USER: CPT | Performed by: INTERNAL MEDICINE

## 2021-08-02 PROCEDURE — G8400 PT W/DXA NO RESULTS DOC: HCPCS | Performed by: INTERNAL MEDICINE

## 2021-08-02 NOTE — PATIENT INSTRUCTIONS
KNOW YOUR KIDNEY NUMBERS    Your kidney speed (eGFR) was 35 ml/min this visit (normal is  ml/min)(Ml/min=milliliters of blood filtered per minute). The higher this number is, the better your kidney function is. Your serum creatinine was 1.43 (normal 0.8-1.2 mg/dl at most labs). The higher this number is, the worse your kidney function is. You are in stage G-IIIB-A1 of chronic kidney disease. Your kidney function has declined as compared to your last visit. Your last eGFR was  38 Ml/Min. Stages of kidney disease    EGFR (estimated glomerular filtration rate)    G-I > 90 ml/min Kidney damage with normal kidney function (blood or protein in the urine)  G-II 60-89 ml/min Normal kidney function with mild damage with or without blood or protein in the urine  G-IIIA 45-59 ml/min Mild to moderate loss of kidney function. G-IIIB 30-44 ml/min Moderate to severe loss of kidney function  G-IV 15-29 ml/min Severe loss of kidney function  G-V < 15 mlmin     May need dialysis or kidney transplant    ACR (urine albumin/creatinine ratio) (Mg/Gm)    A-1      ACR<30 Normal to mildly increased protein in the urine. A-2 ACR  Moderate increase in urine protein loss. A-3 ACR >300 Severe increase in urine protein loss    Our goal is to keep your eGFR going as fast as possible ( ml/min is normal). If your eGFR declines to 15-24 ml/min and stays there without recovery,  we will begin to educate you about dialysis or kidney transplant. We also want to keep the protein in your urine as low as possible. The leading cause of kidney disease in the world is diabetes mellitus. Keep your sugar  as much as possible. The second leading cause is hypertension. Keep Your blood pressure 120-140/70-80 as much as possible. If you need refills, call the office or your drug store. You may call the office any time with any questions or concerns.     DUE TO THE CORONAVIRUS CONCERN, PLEASE LIMIT YOUR TIME IN PUBLIC. [address] YOUR HANDS COMPLETELY AND FREQUENTLY. Ector Gillette Dk

## 2021-08-02 NOTE — PROGRESS NOTES
[facility]    [address]  [address], [address]  [phone]       Progress Note    8/2/2021 2:27 PM    Pt Name:    Loretta Thomas:    1940  Primary Care Physician:  Jackson Oswald MD       Chief Complaint:   Chief Complaint   Patient presents with    Chronic Kidney Disease    Hypertension    Other     non functioning right kidney of unknown etiology        History of Chief Complaint: CKD stage G-IIIB-A1 from HTN and non function of the right kidney. Subjective:  I last saw the patient in clinic 06/07/2021. I follow the patient for Chronic Kidney disease stage G-IIIB-A1. Since our last visit the patient has not been hospitalized. The patient is sleeping well at night with 1-2 times per night nocturia. The patient has a fair appetite and is remaining active. The patient denied N/V/C/D/SOB/CP. She has no trouble at bladder emptying. She complained of early satiety on the last visit. CT scan of the abdomen did not explain this. She has right chest wall pain with breathing. She has had pleurisy before. COVID-19 screening  Fever: none  Cough: none  Exposure: none  Shortness of breath: none    Micro albumin/creatinine ratio:   eGFR: 35 Ml/min (it was 38 Ml/Min last visit)  SCr: 1.43 Mg/Dl (It was 1.32 Mg/Dl last visit)      Last six eGFR readings:  Lab Results   Component Value Date    LABGLOM 33 01/27/2021    LABGLOM 29 05/16/2018    LABGLOM 26 02/16/2018          Objective:  VITALS:  /72 (Site: Right Upper Arm, Position: Sitting, Cuff Size: Small Adult)   Pulse 63   Resp 18   Ht 5' 5\" (1.651 m)   Wt 164 lb (74.4 kg)   SpO2 97%   BMI 27.29 kg/m²   Weight:   Wt Readings from Last 3 Encounters:   08/02/21 164 lb (74.4 kg)   06/07/21 175 lb (79.4 kg)   03/10/21 173 lb 0.6 oz (78.5 kg)     Body mass index is 27.29 kg/m². Physical examination    General:  Alert and cooperative with exam  HEENT:  Normocephalic. No lesions nor rashes. JATIN. EOMI  Neck:   No JVD and no bruits. Thyroid gland is normal  Lungs:  Breathing easily. No rales nor rhonchi. No cough nor sputum production. Heart[de-identified]            RRR. No murmurs nor rubs. PMI is not enlarged nor displaced. Abdomen:  Soft and non tender. Bowel sounds are active in all four quadrants. Extremities:  1+ edema  Neurologic:  CN II-XII are intact. No deficits noted. Muscle strength and tone are equal throughout. Skin:                Warm and dry with no rashes. Muscles:         Hand  and leg strength are equal and strong bilaterally.      Lab Data      CBC:   Lab Results   Component Value Date    WBC 6.8 07/26/2021    HGB 11.8 (L) 07/26/2021    HCT 35.3 07/26/2021    MCV 93.5 07/26/2021     07/26/2021     BMP:    Lab Results   Component Value Date     07/26/2021     06/10/2021     04/26/2021    K 4.2 07/26/2021    K 3.8 06/10/2021    K 4.5 04/26/2021     07/26/2021     06/10/2021     04/26/2021    CO2 30 07/26/2021    CO2 28 06/10/2021    CO2 30 04/26/2021    BUN 29 (H) 07/26/2021    BUN 35 (H) 06/10/2021    BUN 39 (H) 04/26/2021    CREATININE 1.43 (H) 07/26/2021    CREATININE 1.51 (H) 06/10/2021    CREATININE 1.32 (H) 04/26/2021    GLUCOSE 100 07/26/2021    GLUCOSE 107 06/10/2021    GLUCOSE 82 04/26/2021      Hepatic:   Lab Results   Component Value Date    AST 14 01/27/2021    ALT 13 01/27/2021    BILITOT 0.5 01/27/2021    ALKPHOS 98 01/27/2021     BNP: No results found for: BNP  Lipids: No results found for: CHOL, HDL  INR: No results found for: INR  URINE: No results found for: NAUR, PROTUR  No results found for: NITRU, COLORU, PHUR, LABCAST, WBCUA, RBCUA, MUCUS, TRICHOMONAS, YEAST, BACTERIA, CLARITYU, SPECGRAV, LEUKOCYTESUR, UROBILINOGEN, BILIRUBINUR, BLOODU, GLUCOSEU, KETUA, AMORPHOUS   Microalbumen/Creatinine ratio:  No components found for: RUCREAT        Medications:    Current Outpatient Medications   Medication Sig Dispense Refill    Omeprazole (PRILOSEC PO) Take 20 mg by mouth daily      metoprolol succinate (TOPROL XL) 25 MG extended release tablet Take 25 mg by mouth daily      clopidogrel (PLAVIX) 75 MG tablet Take 75 mg by mouth daily      isosorbide mononitrate (IMDUR) 30 MG extended release tablet Take 30 mg by mouth daily      apixaban (ELIQUIS) 2.5 MG TABS tablet Take 2.5 mg by mouth daily       furosemide (LASIX) 20 MG tablet Take 20 mg by mouth daily      lisinopril (PRINIVIL;ZESTRIL) 2.5 MG tablet Take 2.5 mg by mouth daily      atorvastatin (LIPITOR) 10 MG tablet Take 10 mg by mouth every evening       No current facility-administered medications for this visit. IMPRESSIONS:        Kidney disease: CKD stage G-IIIB-A1  Anemia: Anemia remains stable. No need for an erythrocyte stimulating agent (KENJI). Bone and mineral metabolism: There is no complaint of bone pain. PLAN:  1. We discussed the eGFR today. 2. We will continue all current medications without changes. 3. Checking urine microalbumin/creatinine ratio. 4. We will see the patient back in 6 months.       _________________________________  Gennaro Cowden.  [facility]      Sky Vergara MD

## 2021-09-22 DIAGNOSIS — R68.81 EARLY SATIETY: ICD-10-CM

## 2022-02-07 LAB
ANION GAP SERPL CALCULATED.3IONS-SCNC: 10 MMOL/L (ref 4–12)
BUN BLDV-MCNC: 47 MG/DL (ref 7–20)
CALCIUM SERPL-MCNC: 9 MG/DL (ref 8.8–10.5)
CHLORIDE BLD-SCNC: 101 MEQ/L (ref 101–111)
CO2: 27 MEQ/L (ref 21–32)
CREAT SERPL-MCNC: 1.46 MG/DL (ref 0.6–1.3)
CREATININE CLEARANCE: 34
GLUCOSE: 96 MG/DL (ref 70–110)
POTASSIUM SERPL-SCNC: 4 MEQ/L (ref 3.6–5)
SODIUM BLD-SCNC: 138 MEQ/L (ref 135–145)

## 2022-02-10 LAB
ABSOLUTE BASO #: 0 /CMM (ref 0–200)
ABSOLUTE EOS #: 100 /CMM (ref 0–500)
ABSOLUTE LYMPH #: 1400 /CMM (ref 1000–4800)
ABSOLUTE MONO #: 700 /CMM (ref 0–800)
ABSOLUTE NEUT #: 4900 /CMM (ref 1800–7700)
BASOPHILS RELATIVE PERCENT: 0.5 % (ref 0–2)
CREATININE, RANDOM URINE: 85.5 MG/DL
EOSINOPHILS RELATIVE PERCENT: 1.9 % (ref 0–6)
HCT VFR BLD CALC: 35.1 % (ref 35–44)
HEMOGLOBIN: 11.5 GM/DL (ref 12–15)
LYMPHOCYTES RELATIVE PERCENT: 19.5 % (ref 15–45)
MCH RBC QN AUTO: 29.6 PG (ref 27.5–33)
MCHC RBC AUTO-ENTMCNC: 32.7 GM/DL (ref 33–36)
MCV RBC AUTO: 90.7 CU MIC (ref 80–97)
MICROALBUMIN UR-MCNC: 55.2 MG/L
MICROALBUMIN/CREAT UR-RTO: 64.5 MG/GM (ref 0–30)
MONOCYTES RELATIVE PERCENT: 9.2 % (ref 2–10)
NEUTROPHILS RELATIVE PERCENT: 68.9 % (ref 40–70)
NUCLEATED RBCS: 0.1 /100 WBC
PDW BLD-RTO: 15.4 % (ref 12–16)
PLATELET # BLD: 264 TH/CMM (ref 150–400)
RBC # BLD: 3.87 MIL/CMM (ref 4–5.1)
WBC # BLD: 7.1 TH/CMM (ref 4.4–10.5)

## 2022-02-14 ENCOUNTER — OFFICE VISIT (OUTPATIENT)
Dept: NEPHROLOGY | Age: 82
End: 2022-02-14
Payer: MEDICARE

## 2022-02-14 VITALS — SYSTOLIC BLOOD PRESSURE: 148 MMHG | OXYGEN SATURATION: 99 % | DIASTOLIC BLOOD PRESSURE: 70 MMHG | HEART RATE: 79 BPM

## 2022-02-14 DIAGNOSIS — R80.9 MICROALBUMINURIA: ICD-10-CM

## 2022-02-14 DIAGNOSIS — I10 HTN (HYPERTENSION), BENIGN: ICD-10-CM

## 2022-02-14 DIAGNOSIS — N18.32 STAGE 3B CHRONIC KIDNEY DISEASE (HCC): Primary | ICD-10-CM

## 2022-02-14 DIAGNOSIS — N26.1 ATROPHY OF RIGHT KIDNEY: ICD-10-CM

## 2022-02-14 PROCEDURE — 1090F PRES/ABSN URINE INCON ASSESS: CPT | Performed by: INTERNAL MEDICINE

## 2022-02-14 PROCEDURE — G8484 FLU IMMUNIZE NO ADMIN: HCPCS | Performed by: INTERNAL MEDICINE

## 2022-02-14 PROCEDURE — G8400 PT W/DXA NO RESULTS DOC: HCPCS | Performed by: INTERNAL MEDICINE

## 2022-02-14 PROCEDURE — G8417 CALC BMI ABV UP PARAM F/U: HCPCS | Performed by: INTERNAL MEDICINE

## 2022-02-14 PROCEDURE — 4040F PNEUMOC VAC/ADMIN/RCVD: CPT | Performed by: INTERNAL MEDICINE

## 2022-02-14 PROCEDURE — 1123F ACP DISCUSS/DSCN MKR DOCD: CPT | Performed by: INTERNAL MEDICINE

## 2022-02-14 PROCEDURE — 1036F TOBACCO NON-USER: CPT | Performed by: INTERNAL MEDICINE

## 2022-02-14 PROCEDURE — 99214 OFFICE O/P EST MOD 30 MIN: CPT | Performed by: INTERNAL MEDICINE

## 2022-02-14 PROCEDURE — G8427 DOCREV CUR MEDS BY ELIG CLIN: HCPCS | Performed by: INTERNAL MEDICINE

## 2022-02-14 RX ORDER — LISINOPRIL 5 MG/1
5 TABLET ORAL DAILY
Qty: 90 TABLET | Refills: 1 | Status: SHIPPED | OUTPATIENT
Start: 2022-02-14

## 2022-02-14 RX ORDER — LANOLIN ALCOHOL/MO/W.PET/CERES
3 CREAM (GRAM) TOPICAL NIGHTLY PRN
COMMUNITY

## 2022-02-14 NOTE — PROGRESS NOTES
[facility]        Outpatient Follow-Up note         2/14/2022 1:47 PM    Patient Name:   Bandar Dempsey:    1940  Primary Care Physician:  Vanessa Griffith MD   [address] [address]  Dept: [phone]  Loc: [phone]     Chief Complaint / Reason for follow-up : Follow Up of CKD     Interval History :  Patient seen and examined by me. Feels well. No cp or SOB.   No hospitalizations and no med changes   Patient     Past History :  Past Medical History:   Diagnosis Date    A-fib [facility])      Past Surgical History:   Procedure Laterality Date    MASTECTOMY Left 2011        Medications :     Outpatient Medications Marked as Taking for the 2/14/22 encounter (Office Visit) with Benigno Ahn MD   Medication Sig Dispense Refill    melatonin 3 MG TABS tablet Take 3 mg by mouth nightly as needed      Omeprazole (PRILOSEC PO) Take 20 mg by mouth daily      metoprolol succinate (TOPROL XL) 25 MG extended release tablet Take 25 mg by mouth daily      clopidogrel (PLAVIX) 75 MG tablet Take 75 mg by mouth daily      isosorbide mononitrate (IMDUR) 30 MG extended release tablet Take 30 mg by mouth daily      apixaban (ELIQUIS) 2.5 MG TABS tablet Take 2.5 mg by mouth daily       furosemide (LASIX) 20 MG tablet Take 20 mg by mouth daily      lisinopril (PRINIVIL;ZESTRIL) 2.5 MG tablet Take 2.5 mg by mouth daily      atorvastatin (LIPITOR) 10 MG tablet Take 10 mg by mouth every evening         Vitals     BP (!) 148/70 (Site: Left Upper Arm, Position: Sitting, Cuff Size: Medium Adult)   Pulse 79   SpO2 99%  Wt Readings from Last 3 Encounters:   08/02/21 164 lb (74.4 kg)   06/07/21 175 lb (79.4 kg)   03/10/21 173 lb 0.6 oz (78.5 kg)        Physical Exam     General -- no distress  Lungs -- clear  Heart -- S1, S2 heard, JVD - no  Abdomen - soft, non-tender  Extremities -- no edema  CNS - awake and alert    Labs, Radiology and Tests    Labs -    2/22           Potassium 4.0           BUN 47           Creatinine 1.46           eGFR 34                       UPCR            UMCR 65                         Renal Ultrasound Scan -- 28/18  Right kidney 5.1 cm and no vascular flow  Left kidney mild hydro and 11.2 cm and a small cyst     Assessment    1. Renal -patient has CKD stage III most likely due to senile nephrosclerosis longstanding hypertension and the fact that she has a single kidney  -Overall still maintaining stable at baseline  -Renal ultrasound scan from 2018 shows mild hydro-  2. Electrolytes appear to be within normal limits  3. Essential hypertension running reasonable continue current medications advised home blood pressure monitoring  4. Hx of atrial fibrillation  5. Mild microalbuminuria already on lisinopril. Increase to 5 mg po daily  6. meds reviewed and D/W patient  7. Follow-up in 6-month    Tests and orders placed this Encounter     Orders Placed This Encounter   Procedures    Comprehensive Metabolic Panel    Creatinine, Random Urine    MICROALBUMIN, RANDOM URINE (W/O CREATININE)    Protein, urine, random       Benigno Ahn M.D  [facility].

## 2022-06-26 ENCOUNTER — HOSPITAL ENCOUNTER (INPATIENT)
Age: 82
LOS: 2 days | Discharge: HOME | DRG: 291 | End: 2022-06-28
Attending: INTERNAL MEDICINE | Admitting: INTERNAL MEDICINE
Payer: MEDICARE

## 2022-06-26 PROBLEM — R79.89 ELEVATED TROPONIN: Status: ACTIVE | Noted: 2022-06-26

## 2022-06-26 PROBLEM — R77.8 ELEVATED TROPONIN: Status: ACTIVE | Noted: 2022-06-26

## 2022-06-26 LAB
ALBUMIN SERPL-MCNC: 3.6 G/DL (ref 3.5–5.1)
ALP BLD-CCNC: 114 U/L (ref 38–126)
ALT SERPL-CCNC: < 5 U/L (ref 11–66)
ANION GAP SERPL CALCULATED.3IONS-SCNC: 13 MEQ/L (ref 8–16)
AST SERPL-CCNC: 15 U/L (ref 5–40)
BACTERIA: ABNORMAL /HPF
BILIRUB SERPL-MCNC: 0.8 MG/DL (ref 0.3–1.2)
BILIRUBIN URINE: NEGATIVE
BLOOD, URINE: NEGATIVE
BUN BLDV-MCNC: 26 MG/DL (ref 7–22)
CALCIUM SERPL-MCNC: 9.3 MG/DL (ref 8.5–10.5)
CASTS 2: ABNORMAL /LPF
CASTS UA: ABNORMAL /LPF
CHARACTER, URINE: CLEAR
CHLORIDE BLD-SCNC: 103 MEQ/L (ref 98–111)
CO2: 23 MEQ/L (ref 23–33)
COLOR: YELLOW
CREAT SERPL-MCNC: 1.4 MG/DL (ref 0.4–1.2)
CRYSTALS, UA: ABNORMAL
EPITHELIAL CELLS, UA: ABNORMAL /HPF
ERYTHROCYTE [DISTWIDTH] IN BLOOD BY AUTOMATED COUNT: 13.3 % (ref 11.5–14.5)
ERYTHROCYTE [DISTWIDTH] IN BLOOD BY AUTOMATED COUNT: 45.8 FL (ref 35–45)
GFR SERPL CREATININE-BSD FRML MDRD: 36 ML/MIN/1.73M2
GLUCOSE BLD-MCNC: 89 MG/DL (ref 70–108)
GLUCOSE URINE: NEGATIVE MG/DL
HCT VFR BLD CALC: 29.8 % (ref 37–47)
HEMOGLOBIN: 9.4 GM/DL (ref 12–16)
KETONES, URINE: NEGATIVE
LEUKOCYTE ESTERASE, URINE: ABNORMAL
MAGNESIUM: 1.7 MG/DL (ref 1.6–2.4)
MCH RBC QN AUTO: 30.3 PG (ref 26–33)
MCHC RBC AUTO-ENTMCNC: 31.5 GM/DL (ref 32.2–35.5)
MCV RBC AUTO: 96.1 FL (ref 81–99)
MISCELLANEOUS 2: ABNORMAL
NITRITE, URINE: NEGATIVE
PH UA: 5 (ref 5–9)
PHOSPHORUS: 3.9 MG/DL (ref 2.4–4.7)
PLATELET # BLD: 283 THOU/MM3 (ref 130–400)
PMV BLD AUTO: 9.4 FL (ref 9.4–12.4)
POTASSIUM SERPL-SCNC: 3.9 MEQ/L (ref 3.5–5.2)
PRO-BNP: ABNORMAL PG/ML (ref 0–1800)
PROCALCITONIN: 0.14 NG/ML (ref 0.01–0.09)
PROTEIN UA: NEGATIVE
RBC # BLD: 3.1 MILL/MM3 (ref 4.2–5.4)
RBC URINE: ABNORMAL /HPF
RENAL EPITHELIAL, UA: ABNORMAL
SODIUM BLD-SCNC: 139 MEQ/L (ref 135–145)
SPECIFIC GRAVITY, URINE: 1.02 (ref 1–1.03)
TOTAL PROTEIN: 5.9 G/DL (ref 6.1–8)
TROPONIN T: 0.01 NG/ML
TSH SERPL DL<=0.05 MIU/L-ACNC: 3.06 UIU/ML (ref 0.4–4.2)
UROBILINOGEN, URINE: 0.2 EU/DL (ref 0–1)
WBC # BLD: 6.5 THOU/MM3 (ref 4.8–10.8)
WBC UA: ABNORMAL /HPF
YEAST: ABNORMAL

## 2022-06-26 PROCEDURE — 82607 VITAMIN B-12: CPT

## 2022-06-26 PROCEDURE — 83036 HEMOGLOBIN GLYCOSYLATED A1C: CPT

## 2022-06-26 PROCEDURE — 84145 PROCALCITONIN (PCT): CPT

## 2022-06-26 PROCEDURE — 84484 ASSAY OF TROPONIN QUANT: CPT

## 2022-06-26 PROCEDURE — 84443 ASSAY THYROID STIM HORMONE: CPT

## 2022-06-26 PROCEDURE — 81001 URINALYSIS AUTO W/SCOPE: CPT

## 2022-06-26 PROCEDURE — 83550 IRON BINDING TEST: CPT

## 2022-06-26 PROCEDURE — 83880 ASSAY OF NATRIURETIC PEPTIDE: CPT

## 2022-06-26 PROCEDURE — 36415 COLL VENOUS BLD VENIPUNCTURE: CPT

## 2022-06-26 PROCEDURE — 80053 COMPREHEN METABOLIC PANEL: CPT

## 2022-06-26 PROCEDURE — 82746 ASSAY OF FOLIC ACID SERUM: CPT

## 2022-06-26 PROCEDURE — 84100 ASSAY OF PHOSPHORUS: CPT

## 2022-06-26 PROCEDURE — 83735 ASSAY OF MAGNESIUM: CPT

## 2022-06-26 PROCEDURE — 83540 ASSAY OF IRON: CPT

## 2022-06-26 PROCEDURE — 2140000000 HC CCU INTERMEDIATE R&B

## 2022-06-26 PROCEDURE — G0379 DIRECT REFER HOSPITAL OBSERV: HCPCS

## 2022-06-26 PROCEDURE — 85027 COMPLETE CBC AUTOMATED: CPT

## 2022-06-26 PROCEDURE — 93005 ELECTROCARDIOGRAM TRACING: CPT | Performed by: INTERNAL MEDICINE

## 2022-06-26 PROCEDURE — G0378 HOSPITAL OBSERVATION PER HR: HCPCS

## 2022-06-26 PROCEDURE — 99223 1ST HOSP IP/OBS HIGH 75: CPT | Performed by: INTERNAL MEDICINE

## 2022-06-26 RX ORDER — SODIUM CHLORIDE 9 MG/ML
INJECTION, SOLUTION INTRAVENOUS PRN
Status: DISCONTINUED | OUTPATIENT
Start: 2022-06-26 | End: 2022-06-28 | Stop reason: HOSPADM

## 2022-06-26 RX ORDER — PANTOPRAZOLE SODIUM 40 MG/1
40 TABLET, DELAYED RELEASE ORAL DAILY
Status: ON HOLD | COMMUNITY
End: 2022-06-28 | Stop reason: HOSPADM

## 2022-06-26 RX ORDER — ONDANSETRON 4 MG/1
4 TABLET, ORALLY DISINTEGRATING ORAL EVERY 8 HOURS PRN
Status: DISCONTINUED | OUTPATIENT
Start: 2022-06-26 | End: 2022-06-28 | Stop reason: HOSPADM

## 2022-06-26 RX ORDER — METOPROLOL SUCCINATE 25 MG/1
25 TABLET, EXTENDED RELEASE ORAL NIGHTLY
Status: DISCONTINUED | OUTPATIENT
Start: 2022-06-27 | End: 2022-06-27

## 2022-06-26 RX ORDER — POLYETHYLENE GLYCOL 3350 17 G/17G
17 POWDER, FOR SOLUTION ORAL DAILY PRN
Status: DISCONTINUED | OUTPATIENT
Start: 2022-06-26 | End: 2022-06-28 | Stop reason: HOSPADM

## 2022-06-26 RX ORDER — PANTOPRAZOLE SODIUM 40 MG/1
40 TABLET, DELAYED RELEASE ORAL DAILY
Status: DISCONTINUED | OUTPATIENT
Start: 2022-06-27 | End: 2022-06-28 | Stop reason: HOSPADM

## 2022-06-26 RX ORDER — ENOXAPARIN SODIUM 100 MG/ML
40 INJECTION SUBCUTANEOUS DAILY
Status: DISCONTINUED | OUTPATIENT
Start: 2022-06-27 | End: 2022-06-26

## 2022-06-26 RX ORDER — ATORVASTATIN CALCIUM 10 MG/1
10 TABLET, FILM COATED ORAL EVERY EVENING
Status: DISCONTINUED | OUTPATIENT
Start: 2022-06-26 | End: 2022-06-28 | Stop reason: HOSPADM

## 2022-06-26 RX ORDER — ACETAMINOPHEN 325 MG/1
650 TABLET ORAL EVERY 6 HOURS PRN
Status: DISCONTINUED | OUTPATIENT
Start: 2022-06-26 | End: 2022-06-28 | Stop reason: HOSPADM

## 2022-06-26 RX ORDER — ISOSORBIDE MONONITRATE 30 MG/1
30 TABLET, EXTENDED RELEASE ORAL DAILY
Status: DISCONTINUED | OUTPATIENT
Start: 2022-06-26 | End: 2022-06-27

## 2022-06-26 RX ORDER — SODIUM CHLORIDE 0.9 % (FLUSH) 0.9 %
10 SYRINGE (ML) INJECTION EVERY 12 HOURS SCHEDULED
Status: DISCONTINUED | OUTPATIENT
Start: 2022-06-26 | End: 2022-06-28 | Stop reason: HOSPADM

## 2022-06-26 RX ORDER — SODIUM CHLORIDE 0.9 % (FLUSH) 0.9 %
10 SYRINGE (ML) INJECTION PRN
Status: DISCONTINUED | OUTPATIENT
Start: 2022-06-26 | End: 2022-06-28 | Stop reason: HOSPADM

## 2022-06-26 RX ORDER — LANOLIN ALCOHOL/MO/W.PET/CERES
3 CREAM (GRAM) TOPICAL NIGHTLY PRN
Status: DISCONTINUED | OUTPATIENT
Start: 2022-06-27 | End: 2022-06-28 | Stop reason: HOSPADM

## 2022-06-26 RX ORDER — ONDANSETRON 2 MG/ML
4 INJECTION INTRAMUSCULAR; INTRAVENOUS EVERY 6 HOURS PRN
Status: DISCONTINUED | OUTPATIENT
Start: 2022-06-26 | End: 2022-06-28 | Stop reason: HOSPADM

## 2022-06-26 RX ORDER — ACETAMINOPHEN 325 MG/1
500 TABLET ORAL EVERY 6 HOURS PRN
COMMUNITY

## 2022-06-26 RX ORDER — CLOPIDOGREL BISULFATE 75 MG/1
75 TABLET ORAL DAILY
Status: DISCONTINUED | OUTPATIENT
Start: 2022-06-27 | End: 2022-06-28 | Stop reason: HOSPADM

## 2022-06-26 RX ORDER — ACETAMINOPHEN 650 MG/1
650 SUPPOSITORY RECTAL EVERY 6 HOURS PRN
Status: DISCONTINUED | OUTPATIENT
Start: 2022-06-26 | End: 2022-06-28 | Stop reason: HOSPADM

## 2022-06-26 ASSESSMENT — LIFESTYLE VARIABLES: HOW OFTEN DO YOU HAVE A DRINK CONTAINING ALCOHOL: NEVER

## 2022-06-26 NOTE — PROGRESS NOTES
Patient is an 78year old from [facility] ED with Dr Angie Davalos transferring. Patient with hx of atrial fib, recent right TKR in [place] 1 week ago, arrived with c/o SOB on exertion for the last 2 days. Physical exam reported as 2+ pitting bilateral lower extremity edema. EKG x 2 reported as atrial fib without other findings, urine (-), LFT normal, Hgb 9.9, BUN 29 (BUN 5 days ago 77), creatinine 1.45 (5 days ago 1.8), GFR 36, D-Dimer elevated but CTA (-) PE, BNP 8900, high sensitivity troponin 359, 375, 400 (normal facility range <59). HR 77, RR 15, SpO2 100 room air, /80. Given Lasix and Lovenox. Accepted in transfer to a 3B bed under hospitalist for elevated troponin.

## 2022-06-27 ENCOUNTER — APPOINTMENT (OUTPATIENT)
Dept: GENERAL RADIOLOGY | Age: 82
DRG: 291 | End: 2022-06-27
Attending: INTERNAL MEDICINE
Payer: MEDICARE

## 2022-06-27 PROBLEM — Z96.641 STATUS POST TOTAL REPLACEMENT OF RIGHT HIP: Status: ACTIVE | Noted: 2022-06-27

## 2022-06-27 PROBLEM — I25.10 CORONARY ARTERY DISEASE INVOLVING NATIVE CORONARY ARTERY OF NATIVE HEART WITHOUT ANGINA PECTORIS: Status: ACTIVE | Noted: 2022-06-27

## 2022-06-27 PROBLEM — D62 POSTOPERATIVE ANEMIA DUE TO ACUTE BLOOD LOSS: Status: ACTIVE | Noted: 2022-06-27

## 2022-06-27 PROBLEM — N18.32 STAGE 3B CHRONIC KIDNEY DISEASE (HCC): Status: ACTIVE | Noted: 2022-06-27

## 2022-06-27 PROBLEM — I50.9 ACUTE CONGESTIVE HEART FAILURE (HCC): Status: ACTIVE | Noted: 2022-06-27

## 2022-06-27 PROBLEM — Z85.038 H/O MALIGNANT NEOPLASM OF COLON: Status: ACTIVE | Noted: 2022-06-27

## 2022-06-27 LAB
AVERAGE GLUCOSE: 81 MG/DL (ref 70–126)
CHOLESTEROL, TOTAL: 116 MG/DL (ref 100–199)
EKG Q-T INTERVAL: 412 MS
EKG QRS DURATION: 92 MS
EKG QTC CALCULATION (BAZETT): 460 MS
EKG R AXIS: -6 DEGREES
EKG T AXIS: 29 DEGREES
EKG VENTRICULAR RATE: 75 BPM
FOLATE: 13.9 NG/ML (ref 4.8–24.2)
HBA1C MFR BLD: 4.7 % (ref 4.4–6.4)
HDLC SERPL-MCNC: 26 MG/DL
IRON SATURATION: 21 % (ref 20–50)
IRON: 36 UG/DL (ref 50–170)
LACTIC ACID: 1.3 MMOL/L (ref 0.5–2)
LDL CHOLESTEROL CALCULATED: 71 MG/DL
LV EF: 45 %
LVEF MODALITY: NORMAL
TOTAL IRON BINDING CAPACITY: 175 UG/DL (ref 171–450)
TRIGL SERPL-MCNC: 95 MG/DL (ref 0–199)
TROPONIN T: 0.02 NG/ML
TROPONIN T: 0.02 NG/ML
VITAMIN B-12: 755 PG/ML (ref 211–911)

## 2022-06-27 PROCEDURE — 6360000002 HC RX W HCPCS: Performed by: INTERNAL MEDICINE

## 2022-06-27 PROCEDURE — 93010 ELECTROCARDIOGRAM REPORT: CPT | Performed by: NUCLEAR MEDICINE

## 2022-06-27 PROCEDURE — 2580000003 HC RX 258: Performed by: INTERNAL MEDICINE

## 2022-06-27 PROCEDURE — 71045 X-RAY EXAM CHEST 1 VIEW: CPT

## 2022-06-27 PROCEDURE — 36415 COLL VENOUS BLD VENIPUNCTURE: CPT

## 2022-06-27 PROCEDURE — 80061 LIPID PANEL: CPT

## 2022-06-27 PROCEDURE — 6370000000 HC RX 637 (ALT 250 FOR IP): Performed by: INTERNAL MEDICINE

## 2022-06-27 PROCEDURE — 96365 THER/PROPH/DIAG IV INF INIT: CPT

## 2022-06-27 PROCEDURE — 93306 TTE W/DOPPLER COMPLETE: CPT

## 2022-06-27 PROCEDURE — 96366 THER/PROPH/DIAG IV INF ADDON: CPT

## 2022-06-27 PROCEDURE — 99223 1ST HOSP IP/OBS HIGH 75: CPT | Performed by: INTERNAL MEDICINE

## 2022-06-27 PROCEDURE — 84484 ASSAY OF TROPONIN QUANT: CPT

## 2022-06-27 PROCEDURE — 99232 SBSQ HOSP IP/OBS MODERATE 35: CPT | Performed by: FAMILY MEDICINE

## 2022-06-27 PROCEDURE — G0378 HOSPITAL OBSERVATION PER HR: HCPCS

## 2022-06-27 PROCEDURE — 6370000000 HC RX 637 (ALT 250 FOR IP): Performed by: STUDENT IN AN ORGANIZED HEALTH CARE EDUCATION/TRAINING PROGRAM

## 2022-06-27 PROCEDURE — 6360000002 HC RX W HCPCS: Performed by: FAMILY MEDICINE

## 2022-06-27 PROCEDURE — 2140000000 HC CCU INTERMEDIATE R&B

## 2022-06-27 PROCEDURE — 83605 ASSAY OF LACTIC ACID: CPT

## 2022-06-27 PROCEDURE — 94760 N-INVAS EAR/PLS OXIMETRY 1: CPT

## 2022-06-27 RX ORDER — MAGNESIUM SULFATE 1 G/100ML
1000 INJECTION INTRAVENOUS ONCE
Status: COMPLETED | OUTPATIENT
Start: 2022-06-27 | End: 2022-06-27

## 2022-06-27 RX ORDER — NITROGLYCERIN 0.4 MG/1
0.4 TABLET SUBLINGUAL EVERY 5 MIN PRN
Status: DISCONTINUED | OUTPATIENT
Start: 2022-06-27 | End: 2022-06-28 | Stop reason: HOSPADM

## 2022-06-27 RX ORDER — METOPROLOL SUCCINATE 25 MG/1
25 TABLET, EXTENDED RELEASE ORAL NIGHTLY
Status: DISCONTINUED | OUTPATIENT
Start: 2022-06-28 | End: 2022-06-28 | Stop reason: HOSPADM

## 2022-06-27 RX ORDER — ISOSORBIDE MONONITRATE 30 MG/1
30 TABLET, EXTENDED RELEASE ORAL DAILY
Status: DISCONTINUED | OUTPATIENT
Start: 2022-06-28 | End: 2022-06-28 | Stop reason: HOSPADM

## 2022-06-27 RX ORDER — POTASSIUM CHLORIDE 20 MEQ/1
20 TABLET, EXTENDED RELEASE ORAL
Status: DISCONTINUED | OUTPATIENT
Start: 2022-06-27 | End: 2022-06-28 | Stop reason: HOSPADM

## 2022-06-27 RX ORDER — MAGNESIUM SULFATE 1 G/100ML
1000 INJECTION INTRAVENOUS ONCE
Status: DISCONTINUED | OUTPATIENT
Start: 2022-06-27 | End: 2022-06-27

## 2022-06-27 RX ORDER — FUROSEMIDE 40 MG/1
40 TABLET ORAL DAILY
Status: DISCONTINUED | OUTPATIENT
Start: 2022-06-27 | End: 2022-06-28 | Stop reason: HOSPADM

## 2022-06-27 RX ORDER — HEPARIN SODIUM (PORCINE) LOCK FLUSH IV SOLN 100 UNIT/ML 100 UNIT/ML
500 SOLUTION INTRAVENOUS ONCE
Status: COMPLETED | OUTPATIENT
Start: 2022-06-27 | End: 2022-06-27

## 2022-06-27 RX ADMIN — SODIUM CHLORIDE, PRESERVATIVE FREE 10 ML: 5 INJECTION INTRAVENOUS at 00:08

## 2022-06-27 RX ADMIN — APIXABAN 5 MG: 5 TABLET, FILM COATED ORAL at 19:52

## 2022-06-27 RX ADMIN — SODIUM CHLORIDE, PRESERVATIVE FREE 10 ML: 5 INJECTION INTRAVENOUS at 08:39

## 2022-06-27 RX ADMIN — ACETAMINOPHEN 650 MG: 325 TABLET ORAL at 08:37

## 2022-06-27 RX ADMIN — HEPARIN SODIUM (PORCINE) LOCK FLUSH IV SOLN 100 UNIT/ML 500 UNITS: 100 SOLUTION at 16:11

## 2022-06-27 RX ADMIN — ATORVASTATIN CALCIUM 10 MG: 10 TABLET, FILM COATED ORAL at 00:08

## 2022-06-27 RX ADMIN — APIXABAN 2.5 MG: 2.5 TABLET, FILM COATED ORAL at 08:37

## 2022-06-27 RX ADMIN — POTASSIUM CHLORIDE 20 MEQ: 1500 TABLET, EXTENDED RELEASE ORAL at 08:37

## 2022-06-27 RX ADMIN — ATORVASTATIN CALCIUM 10 MG: 10 TABLET, FILM COATED ORAL at 19:52

## 2022-06-27 RX ADMIN — ISOSORBIDE MONONITRATE 30 MG: 30 TABLET, EXTENDED RELEASE ORAL at 00:08

## 2022-06-27 RX ADMIN — MAGNESIUM SULFATE HEPTAHYDRATE 1000 MG: 1 INJECTION, SOLUTION INTRAVENOUS at 01:19

## 2022-06-27 RX ADMIN — PANTOPRAZOLE SODIUM 40 MG: 40 TABLET, DELAYED RELEASE ORAL at 08:39

## 2022-06-27 RX ADMIN — POLYETHYLENE GLYCOL 3350 17 G: 17 POWDER, FOR SOLUTION ORAL at 19:53

## 2022-06-27 RX ADMIN — FUROSEMIDE 40 MG: 40 TABLET ORAL at 08:37

## 2022-06-27 RX ADMIN — SODIUM CHLORIDE, PRESERVATIVE FREE 10 ML: 5 INJECTION INTRAVENOUS at 19:53

## 2022-06-27 RX ADMIN — Medication 3 MG: at 19:57

## 2022-06-27 RX ADMIN — CLOPIDOGREL BISULFATE 75 MG: 75 TABLET ORAL at 08:37

## 2022-06-27 ASSESSMENT — PAIN SCALES - GENERAL
PAINLEVEL_OUTOF10: 3
PAINLEVEL_OUTOF10: 0
PAINLEVEL_OUTOF10: 0

## 2022-06-27 ASSESSMENT — ENCOUNTER SYMPTOMS
ALLERGIC/IMMUNOLOGIC NEGATIVE: 1
EYES NEGATIVE: 1
GASTROINTESTINAL NEGATIVE: 1
SHORTNESS OF BREATH: 1

## 2022-06-27 NOTE — CARE COORDINATION
6/27/22, 8:40 AM EDT  DISCHARGE PLANNING EVALUATION:    Marquez Umanzor       Admitted: 6/26/2022/ 2138   Hospital day: 1   Location: [address] [place] [address]- Reason for admit: Elevated troponin [R77.8]   PMH:  has a past medical history of A-fib [facility]), Cerebral artery occlusion with cerebral infarction ([address] and GERD (gastroesophageal reflux disease). Procedure: None    Barriers to Discharge:  Transferred from [facility] with SOB x2 days; pt had right total hip 1 week ago at [facility].  Troponins 0.013 and 0.018. BNP 34665.0. Consulting Cardiology. Lasix po daily. Eliquis. Plavix. Protonix. Cardiology planning to treat medically. PCP: Tom Neville MD  Readmission Risk Score: 13.5 ( )%  Patient's Healthcare Decision Maker: Legal Next of Kin    Patient Goals/Plan/Treatment Preferences: Spoke with Francesca. She lives at home with her daughter Susanne Phipps. Pt states family has been getting her to all her appts. She has a cane, walker, shower chair, grab bars and walk-in shower. States she is planning to start OP therapy when medically cleared. Transportation/Food Security/Housekeeping Addressed:  No issues identified.

## 2022-06-27 NOTE — H&P
Patient: [facility]    Unit/Bed: 1P-91/110-U    YOB: 1940    MRN: [id number]    Acct: [de-identified]     Admitting Diagnosis: Elevated troponin [R77.8]    Admit Date:  6/26/2022    CC: Shortness of breath x 2 days. HPI :  66-year-old female patient with a direct admission from [facility] ED for SOB, elevated BNP   and troponin - s/p right LURDES 1 week ago at an OSH. Patient's chief complaint is shortness of breath x2 days without chest pain or chest pressure. History of lower extremity swelling prior to right hip surgery. Patient has been on Eliquis for long-term [employer] [address] because of chronic atrial fibrillation. CTA done at the OSH was negative for PE. History of CAD with 2 stents. Patient follows up with Dr. Nova Jules at [facility]. Patient is clinically stable at the time of interview. She denies any chest pain. Initial vital signs temperature 36.8 °C, respirate of 16, heart rate 84, blood pressure 162/78, oxygen saturation 99% on room air. Repeat labs sodium 139, potassium 3.9, chloride 103, CO2 23, BUN 26, creatinine 1.4 from 1.46 on 2/7/2022, anion gap 13.0, magnesium 1.7, blood sugar 89, calcium 9.3, phosphorus 3.9, total protein 5.9, procalcitonin 0.14, proBNP 11,912, troponin 0.013, albumin 3.6, alkaline phosphatase 114, ALT is less than 5, AST 15, total bilirubin 0.8, total protein 5.9 and TSH is 3.060. WBC 6.3, hemoglobin 9.4 from 11.5 on 2/10/2022, MCV 96.1, platelets 027. Twelve-lead E[facility] done here at [facility] which reviewed shows atrial fibrillation at 75 beats minute. QTC of 460 ms. Q waves in lead III, aVF, V1 and V2. Suspected LVH. No ST segment elevation or depression. Patient was given IV Lasix and Lovenox prior to transfer. Review of Systems   Constitutional: Negative. HENT: Negative. Eyes: Negative. Respiratory: Positive for shortness of breath. Cardiovascular: Negative. Gastrointestinal: Negative. Endocrine: Negative. Genitourinary: Negative. Musculoskeletal: Negative. Skin: Negative. Allergic/Immunologic: Negative. Neurological: Negative. Negative for dizziness. Hematological: Negative. Psychiatric/Behavioral: Negative. Past Medical History:        Diagnosis Date    A-fib [facility])     [facility]erebral artery occlusion with cerebral infarction [address]     GERD (gastroesophageal reflux disease)        Past Surgical History:        Procedure Laterality Date    COLONOSCOPY      MASTECTOMY Left 2011       Medications Prior to Admission:    Prior to Admission medications    Medication Sig Start Date End Date Taking? Authorizing Provider   pantoprazole (PROTONIX) 40 MG tablet Take 40 mg by mouth daily   Yes Historical Provider, MD   acetaminophen (TYLENOL) 325 MG tablet Take 500 mg by mouth every 6 hours as needed for Pain   Yes Historical Provider, MD   melatonin 3 MG TABS tablet Take 3 mg by mouth nightly as needed    Historical Provider, MD   lisinopril (PRINIVIL;ZESTRIL) 5 MG tablet Take 1 tablet by mouth daily 2/14/22   [facility], MD   Omeprazole (PRILOSEC PO) Take 20 mg by mouth daily    Historical Provider, MD   metoprolol succinate (TOPROL XL) 25 MG extended release tablet Take 25 mg by mouth daily    Historical Provider, MD   clopidogrel (PLAVIX) 75 MG tablet Take 75 mg by mouth daily    Historical Provider, MD   isosorbide mononitrate (IMDUR) 30 MG extended release tablet Take 30 mg by mouth daily    Historical Provider, MD   apixaban (ELIQUIS) 2.5 MG TABS tablet Take 2.5 mg by mouth daily     Historical Provider, MD   furosemide (LASIX) 20 MG tablet Take 20 mg by mouth daily    Historical Provider, MD   atorvastatin (LIPITOR) 10 MG tablet Take 10 mg by mouth every evening    Historical Provider, MD       Allergies:    No known allergies and Tramadol    Social History:    TOBACCO:   reports that she is a non-smoker but has been exposed to tobacco smoke.  She has never used smokeless tobacco.    ETOH:   has no history on file for alcohol use. Family History:        Problem Relation Age of Onset    Heart Disease Mother     Liver Disease Father     Heart Disease Sister     Heart Disease Brother     Diabetes Brother     Heart Disease Sister     Diabetes Brother        Objective:   BP (!) 170/76   Pulse 77   Temp 98.2 °F (36.8 °C) (Oral)   Resp 16   Ht 5' 6\" (1.676 m)   Wt 163 lb 9.6 oz (74.2 kg)   SpO2 97%   BMI 26.41 kg/m²   No intake or output data in the 24 hours ending 06/27/22 0055    Physical Exam  Vitals and nursing note reviewed. Constitutional:       General: She is not in acute distress. Appearance: Normal appearance. She is normal weight. She is not ill-appearing, toxic-appearing or diaphoretic. HENT:      Head: Normocephalic and atraumatic. Right Ear: External ear normal.      Left Ear: External ear normal.      Nose: Nose normal. No congestion or rhinorrhea. Mouth/Throat:      Mouth: Mucous membranes are moist.      Pharynx: Oropharynx is clear. No oropharyngeal exudate or posterior oropharyngeal erythema. Eyes:      General: No scleral icterus. Right eye: No discharge. Left eye: No discharge. Extraocular Movements: Extraocular movements intact. Conjunctiva/sclera: Conjunctivae normal.      Pupils: Pupils are equal, round, and reactive to light. Neck:      Vascular: No carotid bruit. Cardiovascular:      Rate and Rhythm: Normal rate and regular rhythm. Pulses: Normal pulses. Heart sounds: Normal heart sounds. No murmur heard. No friction rub. No gallop. Pulmonary:      Effort: Pulmonary effort is normal. No respiratory distress. Breath sounds: Normal breath sounds. No stridor. No wheezing, rhonchi or rales. Chest:      Chest wall: No tenderness. Abdominal:      General: Abdomen is flat. Bowel sounds are normal. There is no distension. Palpations: Abdomen is soft. There is no mass. Tenderness: There is no abdominal tenderness. There is no right CVA tenderness, left CVA tenderness, guarding or rebound. Hernia: No hernia is present. Genitourinary:     Comments: Indwelling Reed catheter. Musculoskeletal:         General: No swelling, tenderness, deformity or signs of injury. Normal range of motion. Cervical back: Normal range of motion and neck supple. No rigidity or tenderness. Right lower leg: No edema. Left lower leg: No edema. Comments: Right hip surgical wound dressing without active bleeding. Lymphadenopathy:      Cervical: No cervical adenopathy. Skin:     General: Skin is warm. Coloration: Skin is not jaundiced or pale. Findings: No bruising, erythema, lesion or rash. Neurological:      General: No focal deficit present. Mental Status: She is alert. Mental status is at baseline. She is disoriented. Cranial Nerves: No cranial nerve deficit. Sensory: No sensory deficit. Motor: No weakness. Coordination: Coordination normal.      Gait: Gait normal.      Deep Tendon Reflexes: Reflexes normal.   Psychiatric:         Mood and Affect: Mood normal.         Behavior: Behavior normal.         Thought Content: Thought content normal.         Judgment: Judgment normal.       Medications:     Continuous Infusions:    PRN Meds:    Data:    CBC:   Recent Labs     06/26/22 2303   WBC 6.5   RBC 3.10*   HGB 9.4*   HCT 29.8*   MCV 96.1          BMP:   Recent Labs     06/26/22  2303      K 3.9      CO2 23   PHOS 3.9   BUN 26*   CREATININE 1.4*       PT/INR: No results for input(s): PROTIME, INR in the last 72 hours. LIVER PROFILE:   Recent Labs     06/26/22  2303   AST 15   ALT <5*   BILITOT 0.8   ALKPHOS 114      Results for Kiera Delgado (MRN [id number]) as of 6/27/2022 00:12   Ref.  Range 6/26/2022 23:03   Total Protein Latest Ref Range: 6.1 - 8.0 g/dL 5.9 (L)   Procalcitonin Latest Ref Range: 0.01 - 0.09 ng/mL 0.14 (H)   Pro-BNP Latest Ref Range: 0.0 - 1800.0 pg/mL 34966.0 (H)     Results for Nubia Barreto (MRN [id number]) as of 6/27/2022 00:12   Ref. Range 6/26/2022 23:03   TSH Latest Ref Range: 0.400 - 4.200 uIU/mL 3.060     Results for Nubia Barreto (MRN [id number]) as of 6/27/2022 00:12   Ref. Range 6/26/2022 23:03   Troponin T Latest Units: ng/ml 0.013 (A)     ABG. None. URINALYSIS. Results for Nubia Barreto (MRN [id number]) as of 6/27/2022 00:12   Ref. Range 6/26/2022 22:43   Color, UA Latest Ref Range: STRAW-YELLOW  YELLOW   Glucose, UA Latest Ref Range: NEGATIVE mg/dl NEGATIVE   Bilirubin, Urine Latest Ref Range: NEGATIVE  NEGATIVE   Ketones, Urine Latest Ref Range: NEGATIVE  NEGATIVE   Blood, Urine Latest Ref Range: NEGATIVE  NEGATIVE   pH, UA Latest Ref Range: 5.0 - 9.0  5.0   Protein, UA Latest Ref Range: NEGATIVE  NEGATIVE   Urobilinogen, Urine Latest Ref Range: 0.0 - 1.0 eu/dl 0.2   Nitrite, Urine Latest Ref Range: NEGATIVE  NEGATIVE   Leukocyte Esterase, Urine Latest Ref Range: NEGATIVE  SMALL (A)   Casts UA Latest Ref Range: NONE SEEN /lpf NONE SEEN   CASTS 2 Latest Ref Range: NONE SEEN /lpf NONE SEEN   WBC, UA Latest Ref Range: 0-4/hpf /hpf 2-4   RBC, UA Latest Ref Range: 0-2/hpf /hpf 0-2   Epithelial Cells, UA Latest Ref Range: 3-5/hpf /hpf 0-2   Renal Epithelial, UA Latest Ref Range: NONE SEEN  NONE SEEN   Bacteria, UA Latest Ref Range: FEW/NONE SEEN /hpf NONE SEEN   Yeast, Urine Latest Ref Range: NONE SEEN  NONE SEEN   Crystals, UA Latest Ref Range: NONE SEEN  NONE SEEN   Character, Urine Latest Ref Range: CLEAR-SL CLOUD  CLEAR   Specific Gravity, Urine Latest Ref Range: 1.002 - 1.030  1.017   MISCELLANEOUS 2 Unknown NONE SEEN       SEROLOGY  None. TUMOR MARKERS. None. MICROBIOLOGY   None. HISTOPATHOLOGY. None. TOXICOLOGY. None. ENDOSCOPE STUDIES. None. SURGICAL PROCEDURES. None. CARDIAC PROCEDURES. None.     IR

## 2022-06-27 NOTE — PROGRESS NOTES
Hospitalist Progress Note    Patient:  Clayton Mas      Unit/Bed:3B-34/034-A    YOB: 1940    MRN: [id number]       Acct: [de-identified]     PCP: Alexa Collins MD    Date of Admission: 6/26/2022    Assessment/Plan:    Anticipated Discharge in :     DARI[facility] [address] Problems    Diagnosis Date Noted    Acute congestive heart failure ([facility] [I50.9] 06/27/2022     Priority: Medium    Status post total replacement of right hip [Z96.641] 06/27/2022     Priority: Medium    Postoperative anemia due to acute blood loss [D62] 06/27/2022     Priority: Medium    H/O malignant neoplasm of colon [Z85.038] 06/27/2022     Priority: Medium    Stage 3b chronic kidney disease ([facility] [N18.32] 06/27/2022     Priority: Medium    Coronary artery disease involving native coronary artery of native heart without angina pectoris [I25.10] 06/27/2022     Priority: Medium    Elevated troponin [R77.8] 06/26/2022     Priority: Medium    Hypertension [I10] 10/31/2018    Atrial fibrillation with controlled ventricular response ([facility] [I48.91] 10/31/2018    Gastroesophageal reflux disease without esophagitis [K21.9] 10/31/2018    Malignant neoplasm of female breast ([facility] Priscilla Finely 10/31/2018    HLD (hyperlipidemia) [E78.5] 09/30/2013    Cerebrovascular accident due to cerebral artery occlusion ([facility] [I63.50] 09/30/2013     Dyspnea rule out acute CHF exacerbation   Echo pending  COnitnue Lasix for now  Continue aily weight monitoring and Fluid balance   resume all BP medications  Switch to ow salt diet, limit fluid intake  Cardiology consulted  Echo pending    CAD without angina  Dyslipidemia. Hypertension.   Continue home medications    H/o CVA    GERD   on PPI        Chief Complaint:   Shortness of breath    Hospital Course:   78-year-old female patient with a direct admission from [facility] ED for SOB, elevated BNP and troponin - s/p right LURDES 1 week ago at an OSH.       Patient's chief complaint is shortness of breath x2 days without chest pain or chest pressure. History of lower extremity swelling prior to right hip surgery.     Patient has been on Eliquis for long-term [address] because of chronic atrial fibrillation.     CTA done at the OSH was negative for PE.     History of CAD with 2 stents. Patient follows up with Dr. Adilene Tang at [facility].     Patient is clinically stable at the time of interview. She denies any chest pain.     Initial vital signs temperature 36.8 °C, respirate of 16, heart rate 84, blood pressure 162/78, oxygen saturation 99% on room air.     Repeat labs sodium 139, potassium 3.9, chloride 103, CO2 23, BUN 26, creatinine 1.4 from 1.46 on 2/7/2022, anion gap 13.0, magnesium 1.7, blood sugar 89, calcium 9.3, phosphorus 3.9, total protein 5.9, procalcitonin 0.14, proBNP 11,912, troponin 0.013, albumin 3.6, alkaline phosphatase 114, ALT is less than 5, AST 15, total bilirubin 0.8, total protein 5.9 and TSH is 3.060.     WBC 6.3, hemoglobin 9.4 from 11.5 on 2/10/2022, MCV 96.1, platelets 814.       Twelve-lead EKG done here at [address] [address] which reviewed shows atrial fibrillation at 75 beats minute. QTC of 460 ms. Q waves in lead III, aVF, V1 and V2. Suspected LVH. No ST segment elevation or depression.     Patient was given IV Lasix and Lovenox prior to transfer. Subjective:   Patient seen and examined, appears comfortable in the chair, without any signs of cardiorespiratory distress. VS stable, afebrile, saturating well on room air. No new complaints.  Awaitng cardiology recs      Medications:  Reviewed    Infusion Medications    sodium chloride       Scheduled Medications    furosemide  40 mg Oral Daily    potassium chloride  20 mEq Oral Daily with breakfast    [START ON 6/28/2022] metoprolol succinate  25 mg Oral Nightly    [START ON 6/28/2022] isosorbide mononitrate  30 mg Oral Daily    sodium chloride flush  10 mL IntraVENous 2 times per day    apixaban  2.5 mg Oral Daily    atorvastatin  10 mg Oral QPM    clopidogrel  75 mg Oral Daily    pantoprazole  40 mg Oral Daily     PRN Meds: nitroGLYCERIN, sodium chloride flush, sodium chloride, ondansetron **OR** ondansetron, polyethylene glycol, acetaminophen **OR** acetaminophen, melatonin    No intake or output data in the 24 hours ending 06/27/22 0829    Diet:  ADULT DIET; Regular; Low Sodium (2 gm); 1800 ml    Exam:  BP (!) 162/72   Pulse 83   Temp 97.2 °F (36.2 °C) (Oral)   Resp 16   Ht 5' 6\" (1.676 m)   Wt 163 lb 9.6 oz (74.2 kg)   SpO2 100%   BMI 26.41 kg/m²     General appearance: No apparent distress, appears stated age and cooperative. HEENT: Pupils equal, round, and reactive to light. Conjunctivae/corneas clear. Neck: Supple, with full range of motion. No jugular venous distention. Trachea midline. Respiratory:  Normal respiratory effort. Clear to auscultation, bilaterally without Rales/Wheezes/Rhonchi. Cardiovascular: Regular rate and rhythm with normal S1/S2 without murmurs, rubs or gallops. Abdomen: Soft, non-tender, non-distended with normal bowel sounds. Musculoskeletal: passive and active ROM x 4 extremities. Skin: Skin color, texture, turgor normal.  No rashes or lesions. Neurologic:  Neurovascularly intact without any focal sensory/motor deficits. Cranial nerves: II-XII intact, grossly non-focal.  Psychiatric: Alert and oriented, thought content appropriate, normal insight  Capillary Refill: Brisk,< 3 seconds   Peripheral Pulses: +2 palpable, equal bilaterally       Labs:   Recent Labs     06/26/22  2303   WBC 6.5   HGB 9.4*   HCT 29.8*        Recent Labs     06/26/22  2303      K 3.9      CO2 23   BUN 26*   CREATININE 1.4*   CALCIUM 9.3   PHOS 3.9     Recent Labs     06/26/22  2303   AST 15   ALT <5*   BILITOT 0.8   ALKPHOS 114     No results for input(s): INR in the last 72 hours. No results for input(s): Sadaf Belts in the last 72 hours.     Urinalysis:      Lab Results Component Value Date    NITRU NEGATIVE 06/26/2022    WBCUA 2-4 06/26/2022    BACTERIA NONE SEEN 06/26/2022    RBCUA 0-2 06/26/2022    BLOODU NEGATIVE 06/26/2022    GLUCOSEU NEGATIVE 06/26/2022       Radiology:  No orders to display       Diet: ADULT DIET; Regular;  Low Sodium (2 gm); 1800 ml    DVT prophylaxis: [] Lovenox                                 [] SCDs                                 [] SQ Heparin                                 [] Encourage ambulation           [] Already on Anticoagulation     Disposition:    [x] Home       [] TCU       [] Rehab       [] Psych       [] SNF       [] [place]       [] Other-    Code Status: Full Code    PT/OT Eval Status: *      Electronically signed by Merari Cunha MD on 6/27/2022 at 8:29 AM

## 2022-06-27 NOTE — PLAN OF CARE
Problem: Discharge Planning  Goal: Discharge to home or other facility with appropriate resources  Outcome: Progressing  Flowsheets (Taken 6/26/2022 2202)  Discharge to home or other facility with appropriate resources:   Identify barriers to discharge with patient and caregiver   Arrange for needed discharge resources and transportation as appropriate     Problem: Safety - Adult  Goal: Free from fall injury  Outcome: Progressing  Flowsheets (Taken 6/27/2022 0619)  Free From Fall Injury:   Based on caregiver fall risk screen, instruct family/caregiver to ask for assistance with transferring infant if caregiver noted to have fall risk factors   Instruct family/caregiver on patient safety     Problem: ABCDS Injury Assessment  Goal: Absence of physical injury  Outcome: Progressing  Flowsheets (Taken 6/27/2022 0619)  Absence of Physical Injury: Implement safety measures based on patient assessment

## 2022-06-27 NOTE — CONSULTS
[facility]'s  Cardiology Consult      Patient:  Janes Shaver  YOB: 1940    MRN: [id number]   Acct: [de-identified]     Primary Care Physician: Claire Dan MD        REASON FOR CONSULT: Elevated troponin, CHF exacerbation          CHIEF COMPLAINT: Shortness of breath      HISTORY OF PRESENT ILLNESS:   Sultana Norris is a 77 yo patient with a history of CAD 2 stents, chronic A. fib on Eliquis, CKD stage III, hypertension, CVA, rheumatoid arthritis, breast/colon cancer in remission, recent total hip arthroplasty 1 week ago who was transferred yesterday from [facility] ED due to 2 days of shortness of breath. Prior admission, patient referred lower extremity swelling, right side, secondary to total hip replacement. Denies chest pain, no syncope, no lightheadedness. Initial labs shows elevated proBNP 75773, positive troponin 0.0 18 normal electrolytes, creatinine 1.4, magnesium 1.7, lactic acid 1.3, globin 9.4, KG A. fib controlled ventricular response, Q waves III /aVF, V1 V2. Chest Xray LLL infiltrate, Troponin progresively increasing. Patient denies currently any chest pain, lightheadedness, dizziness or SOB. We will consult noted to suggest diagnostic LHC versus stress test.          All labs, EKG's, diagnostic testing and images as well as cardiac cath, stress testing   were reviewed during this encounter    PREVIOUS CARDIAC TESTING    Ek/25, A. fib controlled ventricular response, Q waves aVF,III, V1 V2    Echo:  Not on file  Str.  Test:  Not on file  Cath:  Not on file    Past Medical History:    Past Medical History:   Diagnosis Date    A-fib [facility])     Cerebral artery occlusion with cerebral infarction ([facility]     GERD (gastroesophageal reflux disease)        Past Surgical History:    Past Surgical History:   Procedure Laterality Date    COLONOSCOPY      MASTECTOMY Left        Medications Prior to Admission:    Medications Prior to Admission: pantoprazole (PROTONIX) 40 MG tablet, Take 40 mg by mouth daily  acetaminophen (TYLENOL) 325 MG tablet, Take 500 mg by mouth every 6 hours as needed for Pain  melatonin 3 MG TABS tablet, Take 3 mg by mouth nightly as needed  lisinopril (PRINIVIL;ZESTRIL) 5 MG tablet, Take 1 tablet by mouth daily  Omeprazole (PRILOSEC PO), Take 20 mg by mouth daily  metoprolol succinate (TOPROL XL) 25 MG extended release tablet, Take 25 mg by mouth daily  clopidogrel (PLAVIX) 75 MG tablet, Take 75 mg by mouth daily  isosorbide mononitrate (IMDUR) 30 MG extended release tablet, Take 30 mg by mouth daily  apixaban (ELIQUIS) 2.5 MG TABS tablet, Take 2.5 mg by mouth daily   furosemide (LASIX) 20 MG tablet, Take 20 mg by mouth daily  atorvastatin (LIPITOR) 10 MG tablet, Take 10 mg by mouth every evening    Allergies:    No known allergies and Tramadol    Social History:    reports that she is a non-smoker but has been exposed to tobacco smoke. She has never used smokeless tobacco.    Family History:   family history includes Diabetes in her brother and brother; Heart Disease in her brother, mother, sister, and sister; Liver Disease in her father. REVIEW OF SYSTEMS:    Constitutional: negative for anorexia, chills and fevers,weight change  Respiratory: negative for cough, positive for dyspnea on exertion, negative for hemoptysis,  and wheezing  Cardiovascular: negative for  orthopnea, palpitations and syncope. Gastrointestinal: negative for abdominal pain,nausea , vomiting, constipation, diarrhea.   Hematologic/lymphatic: negative for bruising,prolonged bleeding,blood clots  Musculoskeletal:negative for muscle weakness, myalgias,wasting  Neurological: negative for coordination problems, dizziness, gait problems and vertigo  Behavioral/Psych:negative for mood/sleep disturbance      PHYSICAL EXAM:  Vitals:  Patient Vitals for the past 24 hrs:   BP Temp Temp src Pulse Resp SpO2 Height Weight   06/27/22 0817 (!) 162/72 97.2 °F (36.2 °C) Oral 83 16 100 % -- --   06/27/22 0505 (!) 145/68 98 °F (36.7 °C) Oral 83 16 96 % -- --   06/27/22 0113 (!) 144/68 -- -- -- -- -- -- --   06/27/22 0006 (!) 170/76 98.2 °F (36.8 °C) Oral 77 16 97 % -- --   06/26/22 2146 (!) 162/78 98.3 °F (36.8 °C) Oral 84 16 99 % 5' 6\" (1.676 m) 163 lb 9.6 oz (74.2 kg)       Last 3 weights: Wt Readings from Last 3 Encounters:   06/26/22 163 lb 9.6 oz (74.2 kg)   08/02/21 164 lb (74.4 kg)   06/07/21 175 lb (79.4 kg)     24 hour intake/output:No intake or output data in the 24 hours ending 06/27/22 1030  BMI:Body mass index is 26.41 kg/m². General Appearance: alert and oriented to person, place and time, well developed and well- nourished, in no acute distress  Skin: warm and dry, no rash or erythema  Eyes: pupils equal, round, and reactive to light, extraocular eye movements intact, conjunctivae normal  Neck: supple and non-tender without mass, no thyromegaly or thyroid nodules, no cervical lymphadenopathy  Pulmonary/Chest: clear to auscultation bilaterally- no wheezes, rales or rhonchi, normal air movement, no respiratory distress  Cardiovascular: normal rate, irregular rhythm, normal S1 and S2, no murmurs, rubs, clicks, or gallops, distal pulses intact, no carotid bruits, Negative JVD  Radial Pulses: intact 2+  Abdomen: soft, non-tender, non-distended, normal bowel sounds, no masses or organomegaly  Extremities: no cyanosis, clubbing .  No Edema  Musculoskeletal: normal range of motion, no joint swelling, deformity or tenderness    LABS:  Recent Labs     06/26/22  2303 06/27/22  0412 06/27/22  0930   TROPONINT 0.013* 0.018* 0.020*     CBC:   Lab Results   Component Value Date    WBC 6.5 06/26/2022    RBC 3.10 06/26/2022    HGB 9.4 06/26/2022    HCT 29.8 06/26/2022    MCV 96.1 06/26/2022    MCH 30.3 06/26/2022    MCHC 31.5 06/26/2022    RDW 15.4 02/10/2022     06/26/2022    MPV 9.4 06/26/2022     BMP:    Lab Results   Component Value Date     06/26/2022 K 3.9 06/26/2022     06/26/2022    CO2 23 06/26/2022    BUN 26 06/26/2022    LABALBU 3.6 06/26/2022    CREATININE 1.4 06/26/2022    CALCIUM 9.3 06/26/2022    LABGLOM 36 06/26/2022    GLUCOSE 89 06/26/2022    GLUCOSE 96 02/07/2022     Hepatic Function Panel:    Lab Results   Component Value Date    ALKPHOS 114 06/26/2022    ALT <5 06/26/2022    AST 15 06/26/2022    PROT 5.9 06/26/2022    BILITOT 0.8 06/26/2022    LABALBU 3.6 06/26/2022     Magnesium:    Lab Results   Component Value Date    MG 1.7 06/26/2022     Warfarin PT/INR:  No components found for: PTPATWAR, PTINRWAR  HgBA1c:    Lab Results   Component Value Date    LABA1C 4.7 06/26/2022     FLP:    Lab Results   Component Value Date    TRIG 95 06/27/2022    HDL 26 06/27/2022    LDLCALC 71 06/27/2022     TSH:    Lab Results   Component Value Date    TSH 3.060 06/26/2022     BNP: No results found for: BNP      ASSESSMENT/PLAN:  49-year-old patient with history of CAD 2 stents, chronic A. fib, CHF, CKD stage III, recent total hip arthroplasty, complaining of 2 days of shortness of breath, no chest pain, no other anginal equivalents. Elevated troponin initial, elevated proBNP initial, EKG with no ST elevation or ST depressions, no previous EKG on file nor echocardiogram or Cath Lab register; currently patient is hemodynamically stable with no chest pain, no tachypneic, oxygen saturations are also normal with no supplementary oxygen, shortness of breath remains stable, will add transthoracic echocardiogram, new cardiac enzymes, chest x-ray in order to assess necessity of LHC results vs  stress test.      Diagnosis  CHF exacerbation  CKD stage III  Elevated troponin, myocardial demand  PE ruled out    Rosmery Ponce MD   10:30 AM  6/27/2022     Attending Supervising Physician's Attestation Statement  I performed a history and physical examination on the patient and discussed the management with the resident physician.  I reviewed and agree with the findings and plan as documented in the resident's note except for as noted below. 79 yo patient with a history of CAD 2 stents, chronic A. fib on Eliquis, CKD stage III, hypertension, CVA, rheumatoid arthritis, breast/colon cancer in remission, recent total hip arthroplasty 1 week ago who was transferred yesterday from [facility] ED due to 2 days of shortness of breath. Cr 1.4. Trop mildly elevated but flat--likely demand. Afib is controlled. No bleeding on Eliquis. CXR showing patchy opacities in the LLL. BNP [id number]. No prior cardiac work-up here for review. Check TTE. Would had Lasix, primary to assess for Abx. No PE by CT at OSH per report. Denies chest pain. Further recommendations based on results and clinical course.    Electronically signed by Jam Flannery MD on 6/27/22 at 4:05 PM EDT

## 2022-06-27 NOTE — PLAN OF CARE
Problem: Discharge Planning  Goal: Discharge to home or other facility with appropriate resources  6/27/2022 1358 by Lexa Valente RN  Outcome: Progressing  6/27/2022 0619 by Brannon Rosario RN  Outcome: Progressing  Flowsheets (Taken 6/26/2022 2202)  Discharge to home or other facility with appropriate resources:   Identify barriers to discharge with patient and caregiver   Arrange for needed discharge resources and transportation as appropriate     Problem: Safety - Adult  Goal: Free from fall injury  6/27/2022 1358 by Lexa Valente RN  Outcome: Progressing  6/27/2022 0619 by Brannon Rosario RN  Outcome: Progressing  Flowsheets (Taken 6/27/2022 0088)  Free From Fall Injury:   Based on caregiver fall risk screen, instruct family/caregiver to ask for assistance with transferring infant if caregiver noted to have fall risk factors   Instruct family/caregiver on patient safety     Problem: ABCDS Injury Assessment  Goal: Absence of physical injury  6/27/2022 1358 by Lexa Valente RN  Outcome: Progressing  6/27/2022 0619 by Brannon Rosario RN  Outcome: Progressing  Flowsheets (Taken 6/27/2022 9785)  Absence of Physical Injury: Implement safety measures based on patient assessment     Problem: Chronic Conditions and Co-morbidities  Goal: Patient's chronic conditions and co-morbidity symptoms are monitored and maintained or improved  Outcome: Progressing

## 2022-06-28 VITALS
OXYGEN SATURATION: 99 % | RESPIRATION RATE: 20 BRPM | BODY MASS INDEX: 24.84 KG/M2 | SYSTOLIC BLOOD PRESSURE: 114 MMHG | WEIGHT: 154.54 LBS | HEART RATE: 86 BPM | HEIGHT: 66 IN | TEMPERATURE: 97.5 F | DIASTOLIC BLOOD PRESSURE: 59 MMHG

## 2022-06-28 LAB
ANION GAP SERPL CALCULATED.3IONS-SCNC: 12 MEQ/L (ref 8–16)
BUN BLDV-MCNC: 25 MG/DL (ref 7–22)
CALCIUM SERPL-MCNC: 8.8 MG/DL (ref 8.5–10.5)
CHLORIDE BLD-SCNC: 105 MEQ/L (ref 98–111)
CO2: 23 MEQ/L (ref 23–33)
CREAT SERPL-MCNC: 1.2 MG/DL (ref 0.4–1.2)
ERYTHROCYTE [DISTWIDTH] IN BLOOD BY AUTOMATED COUNT: 13.6 % (ref 11.5–14.5)
ERYTHROCYTE [DISTWIDTH] IN BLOOD BY AUTOMATED COUNT: 46.6 FL (ref 35–45)
GFR SERPL CREATININE-BSD FRML MDRD: 43 ML/MIN/1.73M2
GLUCOSE BLD-MCNC: 97 MG/DL (ref 70–108)
HCT VFR BLD CALC: 29.7 % (ref 37–47)
HEMOGLOBIN: 9.1 GM/DL (ref 12–16)
MCH RBC QN AUTO: 29.6 PG (ref 26–33)
MCHC RBC AUTO-ENTMCNC: 30.6 GM/DL (ref 32.2–35.5)
MCV RBC AUTO: 96.7 FL (ref 81–99)
PLATELET # BLD: 290 THOU/MM3 (ref 130–400)
PMV BLD AUTO: 9.3 FL (ref 9.4–12.4)
POTASSIUM SERPL-SCNC: 3.8 MEQ/L (ref 3.5–5.2)
RBC # BLD: 3.07 MILL/MM3 (ref 4.2–5.4)
SODIUM BLD-SCNC: 140 MEQ/L (ref 135–145)
TROPONIN T: 0.02 NG/ML
WBC # BLD: 6.6 THOU/MM3 (ref 4.8–10.8)

## 2022-06-28 PROCEDURE — 6370000000 HC RX 637 (ALT 250 FOR IP): Performed by: STUDENT IN AN ORGANIZED HEALTH CARE EDUCATION/TRAINING PROGRAM

## 2022-06-28 PROCEDURE — 99238 HOSP IP/OBS DSCHRG MGMT 30/<: CPT | Performed by: FAMILY MEDICINE

## 2022-06-28 PROCEDURE — 84484 ASSAY OF TROPONIN QUANT: CPT

## 2022-06-28 PROCEDURE — G0378 HOSPITAL OBSERVATION PER HR: HCPCS

## 2022-06-28 PROCEDURE — 6370000000 HC RX 637 (ALT 250 FOR IP): Performed by: INTERNAL MEDICINE

## 2022-06-28 PROCEDURE — 2580000003 HC RX 258: Performed by: INTERNAL MEDICINE

## 2022-06-28 PROCEDURE — 36415 COLL VENOUS BLD VENIPUNCTURE: CPT

## 2022-06-28 PROCEDURE — 99232 SBSQ HOSP IP/OBS MODERATE 35: CPT | Performed by: NURSE PRACTITIONER

## 2022-06-28 PROCEDURE — 80048 BASIC METABOLIC PNL TOTAL CA: CPT

## 2022-06-28 PROCEDURE — 85027 COMPLETE CBC AUTOMATED: CPT

## 2022-06-28 RX ORDER — FUROSEMIDE 40 MG/1
40 TABLET ORAL DAILY
Qty: 60 TABLET | Refills: 3 | Status: SHIPPED | OUTPATIENT
Start: 2022-06-29

## 2022-06-28 RX ADMIN — FUROSEMIDE 40 MG: 40 TABLET ORAL at 08:42

## 2022-06-28 RX ADMIN — SODIUM CHLORIDE, PRESERVATIVE FREE 10 ML: 5 INJECTION INTRAVENOUS at 08:41

## 2022-06-28 RX ADMIN — POTASSIUM CHLORIDE 20 MEQ: 1500 TABLET, EXTENDED RELEASE ORAL at 08:42

## 2022-06-28 RX ADMIN — CLOPIDOGREL BISULFATE 75 MG: 75 TABLET ORAL at 08:43

## 2022-06-28 RX ADMIN — ACETAMINOPHEN 650 MG: 325 TABLET ORAL at 04:08

## 2022-06-28 RX ADMIN — ISOSORBIDE MONONITRATE 30 MG: 30 TABLET, EXTENDED RELEASE ORAL at 08:42

## 2022-06-28 RX ADMIN — PANTOPRAZOLE SODIUM 40 MG: 40 TABLET, DELAYED RELEASE ORAL at 08:42

## 2022-06-28 RX ADMIN — APIXABAN 5 MG: 5 TABLET, FILM COATED ORAL at 08:43

## 2022-06-28 ASSESSMENT — PAIN SCALES - GENERAL
PAINLEVEL_OUTOF10: 0
PAINLEVEL_OUTOF10: 8

## 2022-06-28 NOTE — FLOWSHEET NOTE
Pt was alone, sitting and eating at the time of the visit. She was dealing with elevated troponin. She said that she will be going home today. She was blessed. 06/28/22 1549   Encounter Summary   Encounter Overview/Reason  Initial Encounter   Service Provided For: Patient   Referral/Consult From: [address] [place] [address] Family members   Last Encounter  06/28/22   Complexity of Encounter Low   Begin Time 1026   End Time  1031   Total Time Calculated 5 min   Spiritual/Emotional needs   Type Spiritual Support   Assessment/Intervention/Outcome   Assessment Calm; Hopeful   Intervention Active listening;Empowerment

## 2022-06-28 NOTE — DISCHARGE SUMMARY
Hospital Medicine Discharge Summary      Patient Identification:   Hue Gardiner   : 1940  MRN: [id number]   Account: [de-identified]      Patient's PCP: Mile Pascual MD    Admit Date: 2022     Discharge Date: 2022      Admitting Physician: Diego Ribeiro MD     Discharge Physician: Jama Grider MD     Discharge Diagnoses: Active Hospital Problems    Diagnosis Date Noted    Acute congestive heart failure ([facility] [address] [I50.9] 2022     Priority: Medium    Status post total replacement of right hip [Z96.641] 2022     Priority: Medium    Postoperative anemia due to acute blood loss [D62] 2022     Priority: Medium    H/O malignant neoplasm of colon [Z85.038] 2022     Priority: Medium    Stage 3b chronic kidney disease ([facility] [address] [N18.32] 2022     Priority: Medium    Coronary artery disease involving native coronary artery of native heart without angina pectoris [I25.10] 2022     Priority: Medium    Elevated troponin [R77.8] 2022     Priority: Medium    Hypertension [I10] 10/31/2018    [facility] fibrillation [facility] [I48.91] 10/31/2018    Gastroesophageal reflux disease without esophagitis [K21.9] 10/31/2018    Malignant neoplasm of female breast ([facility] Martina Howard 10/31/2018    HLD (hyperlipidemia) [E78.5] 2013    Cerebrovascular accident due to cerebral artery occlusion ([facility] [I63.50] 2013       The patient was seen and examined on day of discharge and this discharge summary is in conjunction with any daily progress note from day of discharge. Hospital Course:   Hue Gardiner is a 78 y.o. female admitted to [address] [place] [address] on 2022 f78year-old female patient with a direct admission from [facility] ED for SOB, elevated BNP and troponin - s/p right LURDES 1 week ago at an OSH.        Patient's chief complaint is shortness of breath x2 days without chest pain or chest pressure.  History of lower extremity swelling prior to right hip surgery.     Patient has been on Eliquis for long-term [address] because of chronic atrial fibrillation.     CTA done at the OSH was negative for PE.     History of CAD with 2 stents.  Patient follows up with Dr. Glass [facility].     Patient is clinically stable at the time of interview.  She denies any chest pain.     Initial vital signs temperature 36.8 °C, respirate of 16, heart rate 84, blood pressure 162/78, oxygen saturation 99% on room air.     Repeat labs sodium 139, potassium 3.9, chloride 103, CO2 23, BUN 26, creatinine 1.4 from 1.46 on 2/7/2022, anion gap 13.0, magnesium 1.7, blood sugar 89, calcium 9.3, phosphorus 3.9, total protein 5.9, procalcitonin 0.14, proBNP 11,912, troponin 0.013, albumin 3.6, alkaline phosphatase 114, ALT is less than 5, AST 15, total bilirubin 0.8, total protein 5.9 and TSH is 3.060.     WBC 6.3, hemoglobin 9.4 from 11.5 on 2/10/2022, MCV 96.1, platelets 024.       Twelve-lead EKG done here at [address] [facility] which reviewed shows atrial fibrillation at 75 beats minute.  QTC of 460 ms.  Q waves in lead III, aVF, V1 and V2.  Suspected LVH.  No ST segment elevation or depression.     Patient was given IV Lasix and Lovenox prior to transfer. She was admitted under the hospitalist service. Cardiology was consulted. Troponins were elevated, although flat, likely demand ischemia. Lasix given. Echo done, no significant change from last Echo. Patient improved clinically. She was discharged stable with OP therapy. Acute on Chronic Diastolic CHF exacerbation  Patient was diuresed with Lasix  resume all BP medications  Follow up with Dr. Precious ARRINGTON without angina  Dyslipidemia.   Hypertension  Continue home medications     H/o CVA     GERD   on PPI       Exam:     Vitals:  Vitals:    06/28/22 0354 06/28/22 0830 06/28/22 1129 06/28/22 1508   BP: (!) 143/57 137/63 130/62 (!) 114/59   Pulse: 83 72 87 86   Resp: 17 18 18 20 Temp: 98.2 °F (36.8 °C) 97.9 °F (36.6 °C) 97.5 °F (36.4 °C) 97.5 °F (36.4 °C)   TempSrc: Oral Oral Oral Oral   SpO2: 97% 98% 98% 99%   Weight: 154 lb 8.7 oz (70.1 kg)      Height:         Weight: Weight: 154 lb 8.7 oz (70.1 kg)     24 hour intake/output:  No intake or output data in the 24 hours ending 07/10/22 9262      General appearance:  No apparent distress, appears stated age and cooperative. HEENT:  Normal cephalic, atraumatic without obvious deformity. Pupils equal, round, and reactive to light. Extra ocular muscles intact. Conjunctivae/corneas clear. Neck: Supple, with full range of motion. No jugular venous distention. Trachea midline. Respiratory:  Normal respiratory effort. Clear to auscultation, bilaterally without Rales/Wheezes/Rhonchi. Cardiovascular:  Regular rate and rhythm with normal S1/S2 without murmurs, rubs or gallops. Abdomen: Soft, non-tender, non-distended with normal bowel sounds. Musculoskeletal:  No clubbing, cyanosis or edema bilaterally. Full range of motion without deformity. Skin: Skin color, texture, turgor normal.  No rashes or lesions. Neurologic:  Neurovascularly intact without any focal sensory/motor deficits. Cranial nerves: II-XII intact, grossly non-focal.  Psychiatric:  Alert and oriented, thought content appropriate, normal insight  Capillary Refill: Brisk,< 3 seconds   Peripheral Pulses: +2 palpable, equal bilaterally       Labs:  For convenience and continuity at follow-up the following most recent labs are provided:      CBC:    Lab Results   Component Value Date/Time    WBC 6.6 06/28/2022 03:42 AM    HGB 9.1 06/28/2022 03:42 AM    HCT 29.7 06/28/2022 03:42 AM     06/28/2022 03:42 AM       Renal:    Lab Results   Component Value Date/Time     06/28/2022 03:42 AM    K 3.8 06/28/2022 03:42 AM     06/28/2022 03:42 AM    CO2 23 06/28/2022 03:42 AM    BUN 25 06/28/2022 03:42 AM    CREATININE 1.2 06/28/2022 03:42 AM    CALCIUM 8.8 06/28/2022 03:42 AM    PHOS 3.9 06/26/2022 11:03 PM         Significant Diagnostic Studies    Radiology:   XR CHEST PORTABLE   Final Result   Patchy opacities near the left lung base which could be due to infiltrate or atelectasis. **This report has been created using voice recognition software. It may contain minor errors which are inherent in voice recognition technology. **      Final report electronically signed by Dr Jt Reyes on 6/27/2022 10:22 AM             Consults:     IP CONSULT TO CARDIOLOGY    Disposition:    [x] Home       [] TCU       [] Rehab       [] Psych       [] SNF       [] [place]       [] Other-    Condition at Discharge: Stable    Code Status:  Prior     Patient Instructions:    Discharge lab work: Activity: activity as tolerated  Diet: No diet orders on file      Follow-up visits:   No follow-up provider specified.        Discharge Medications:        Medication List      CHANGE how you take these medications    apixaban 5 MG Tabs tablet  Commonly known as: ELIQUIS  Take 1 tablet by mouth in the morning and at bedtime  What changed:   · medication strength  · how much to take  · when to take this     furosemide 40 MG tablet  Commonly known as: LASIX  Take 1 tablet by mouth daily  What changed:   · medication strength  · how much to take        CONTINUE taking these medications    acetaminophen 325 MG tablet  Commonly known as: TYLENOL     atorvastatin 10 MG tablet  Commonly known as: LIPITOR     clopidogrel 75 MG tablet  Commonly known as: PLAVIX     isosorbide mononitrate 30 MG extended release tablet  Commonly known as: IMDUR     lisinopril 5 MG tablet  Commonly known as: PRINIVIL;ZESTRIL  Take 1 tablet by mouth daily     melatonin 3 MG Tabs tablet     metoprolol succinate 25 MG extended release tablet  Commonly known as: TOPROL XL     PRILOSEC PO        STOP taking these medications    pantoprazole 40 MG tablet  Commonly known as: PROTONIX           Where to Get Your Medications      These medications were sent to [facility] [address], [address]  [address], [place] [address]    Phone: [phone]   · apixaban 5 MG Tabs tablet  · furosemide 40 MG tablet         Time Spent on discharge is more than 30 minutes in the examination, evaluation, counseling and review of medications and discharge plan. Signed: Thank you Subha Keyes MD for the opportunity to be involved in this patient's care.     Electronically signed by Patricia Moreno MD on 7/10/2022 at 11:36 PM

## 2022-06-28 NOTE — PROGRESS NOTES
Cardiology Progress Note      Patient:  Chel Vidal  YOB: 1940  MRN: [id number]   Acct: [de-identified]  [facility] Date:  6/26/2022  Primary Cardiologist: Dr. John Solis  Seen by Dr. Thomas Ocampo    Per prior cardiology consult note-  REASON FOR CONSULT: Elevated troponin, CHF exacerbation              CHIEF COMPLAINT: Shortness of breath        HISTORY OF PRESENT ILLNESS:   Zaida Ni is a 77 yo patient with a history of CAD 2 stents, chronic A. fib on Eliquis, CKD stage III, hypertension, CVA, rheumatoid arthritis, breast/colon cancer in remission, recent total hip arthroplasty 1 week ago who was transferred yesterday from [facility] ED due to 2 days of shortness of breath. Prior admission, patient referred lower extremity swelling, right side, secondary to total hip replacement. Denies chest pain, no syncope, no lightheadedness. Initial labs shows elevated proBNP 49322, positive troponin 0.0 18 normal electrolytes, creatinine 1.4, magnesium 1.7, lactic acid 1.3, globin 9.4, KG A. fib controlled ventricular response, Q waves III /aVF, V1 V2. Chest Xray LLL infiltrate, Troponin progresively increasing. Patient denies currently any chest pain, lightheadedness, dizziness or SOB.    We will consult noted to suggest diagnostic LHC versus stress test.    Subjective (Events in last 24 hours):     Pt up in chair - she stats she is feeling 100% improved and ready to go home     In discussion there has been no CP or exertional SOB - she fell breaking her hip trying a new floor bicycle pedal     She follows with John Solis at [facility] - no recent workup that she is aware of   Daughters in room     Trop slight elevated - EKG no acute ischemic changes     Objective:   /62   Pulse 87   Temp 97.5 °F (36.4 °C) (Oral)   Resp 18   Ht 5' 6\" (1.676 m)   Wt 154 lb 8.7 oz (70.1 kg)   SpO2 98%   BMI 24.94 kg/m²        TELEMETRY:AFB CVR     Physical Exam:  General Appearance: alert and oriented to person, place and time, in no acute distress  Cardiovascular: irregular rhythm, normal S1 and S2, no murmurs, rubs, clicks, or gallops, distal pulses intact  Pulmonary/Chest: clear to auscultation bilaterally- no wheezes, rales or rhonchi, normal air movement, no respiratory distress  Abdomen: soft, non-tender, non-distended, normal bowel sounds, no masses Extremities: no cyanosis, clubbing or edema, pulses present    Musculoskeletal: normal range of motion, no joint swelling, deformity or tenderness  Neurological: alert, oriented, normal speech, no focal findings or movement disorder noted    Medications:    furosemide  40 mg Oral Daily    potassium chloride  20 mEq Oral Daily with breakfast    metoprolol succinate  25 mg Oral Nightly    isosorbide mononitrate  30 mg Oral Daily    apixaban  5 mg Oral BID    sodium chloride flush  10 mL IntraVENous 2 times per day    atorvastatin  10 mg Oral QPM    clopidogrel  75 mg Oral Daily    pantoprazole  40 mg Oral Daily      sodium chloride       nitroGLYCERIN, 0.4 mg, Q5 Min PRN  sodium chloride flush, 10 mL, PRN  sodium chloride, , PRN  ondansetron, 4 mg, Q8H PRN   Or  ondansetron, 4 mg, Q6H PRN  polyethylene glycol, 17 g, Daily PRN  acetaminophen, 650 mg, Q6H PRN   Or  acetaminophen, 650 mg, Q6H PRN  melatonin, 3 mg, Nightly PRN        Diagnostics:    Echo:   Electronically signed by Rogers Virk MD (Interpreting   physician) on 06/27/2022 at 09:20 PM   ----------------------------------------------------------------      Findings      Mitral Valve   Trace mitral regurgitation is present. Aortic Valve   The aortic valve leaflets were not well visualized. Aortic valve leaflets are somewhat thickened. Aortic valve leaflets are Mildly calcified. Mild-to-moderate aortic regurgitation is noted. Tricuspid Valve   Mild tricuspid regurgitation visualized. Pulmonic Valve   The pulmonic valve was not well visualized .    Trivial pulmonic regurgitation visualized. Left Atrium   Left atrial size was normal.      Left Ventricle   Ejection fraction is visually estimated at 45%. There was mild global hypokinesis of the left ventricle. Right Atrium   Right atrial size was normal.      Right Ventricle   The right ventricular size was normal with normal systolic function and   wall thickness. Pericardial Effusion   The pericardium was normal in appearance with no evidence of a pericardial   effusion. Pleural Effusion   No evidence of pleural effusion. Aorta / Great Vessels   -Aortic root dimension within normal limits.   -The Pulmonary artery is within normal limits. -IVC size is within normal limits with normal respiratory phasic changes. Stress: unknown    Left Heart Cath: unknown    Lab Data:    Cardiac Enzymes:  No results for input(s): CKTOTAL, CKMB, CKMBINDEX, TROPONINI in the last 72 hours.     CBC:   Lab Results   Component Value Date    WBC 6.6 06/28/2022    RBC 3.07 06/28/2022    HGB 9.1 06/28/2022    HCT 29.7 06/28/2022     06/28/2022       CMP:    Lab Results   Component Value Date     06/28/2022    K 3.8 06/28/2022     06/28/2022    CO2 23 06/28/2022    BUN 25 06/28/2022    CREATININE 1.2 06/28/2022    LABGLOM 43 06/28/2022    GLUCOSE 97 06/28/2022    GLUCOSE 96 02/07/2022    CALCIUM 8.8 06/28/2022       Hepatic Function Panel:    Lab Results   Component Value Date    ALKPHOS 114 06/26/2022    ALT <5 06/26/2022    AST 15 06/26/2022    PROT 5.9 06/26/2022    BILITOT 0.8 06/26/2022    LABALBU 3.6 06/26/2022       Magnesium:    Lab Results   Component Value Date    MG 1.7 06/26/2022       PT/INR:  No results found for: PROTIME, INR    HgBA1c:    Lab Results   Component Value Date    LABA1C 4.7 06/26/2022       FLP:    Lab Results   Component Value Date    TRIG 95 06/27/2022    HDL 26 06/27/2022    LDLCALC 71 06/27/2022       TSH:    Lab Results   Component Value Date    TSH 3.060 06/26/2022 Assessment:    Acute diastolic chf - resolved    likely secondary to fluid overload with recent surgery     Mild elevated trop - no ACS -- demand ischemia     CDMP EF 45% - from last echo - no change     Prior CAD with PCI - unknown details    No ischemic EKG changes or anginal equivalents     Chronic AFB - CVR - on eliquis     HTN  HLP  LDL 71    Hx CVA      Plan:  · At present - pt is stable from cardiac standpoint   · Would treat medically and have her follow with primary cardio- Dr. Lydia Sun for any needed workup as we have no records here - no immediate need for workup ASAP - pt  And family in agreement - ok to DC - med rec done          Electronically signed by AJAY Fallon CNP on 6/28/2022 at 12:44 PM

## 2022-06-29 ENCOUNTER — CARE COORDINATION (OUTPATIENT)
Dept: CASE MANAGEMENT | Age: 82
End: 2022-06-29

## 2022-06-29 NOTE — PROGRESS NOTES
CLINICAL PHARMACY NOTE: MEDS TO BEDS    Total # of Prescriptions Filled: 2   The following medications were delivered to the patient:  Eliquis 5mg  Furosemide 40mg    Additional Documentation:

## 2022-06-29 NOTE — CARE COORDINATION
Care Transitions Outreach Attempt    Call within 2 business days of discharge: Yes   Attempted to reach patient for transitions of care follow up. Unable to reach patient. Patient: Lizett Lott Patient : 1940 MRN: [id number]    Last Discharge [facility]       Complaint Diagnosis Description Type Department Provider    22   Admission (Discharged) Wolfgang Vinson MD        24 Hour Transition of Care Call:    1st Attempt to contact patient for Initial 24 Hour Transition from hospital to home Call:   Patient not reached. Left HIPAA Compliant message on Voice Mail to call [facility] (number given) with any questions and an update on patient's condition since discharge. [address] [facility] [address]. F/u 2022. Will continue to follow. Dmitri Levine LPN    [phone]  [facility] / [place] [address] Coordinator          Was this an external facility discharge?  No Discharge Facility: [facility]    Noted following upcoming appointments from discharge chart review:   [facility] follow up appointment(s):   Future Appointments   Date Time Provider Tim Villanueva   2022  2:20 PM Adeola Shipman MD [facility]     [id number] [address] follow up appointment(s):

## 2022-06-30 ENCOUNTER — CARE COORDINATION (OUTPATIENT)
Dept: CASE MANAGEMENT | Age: 82
End: 2022-06-30

## 2022-06-30 NOTE — CARE COORDINATION
Care Transitions Outreach Attempt     Call within 2 business days of discharge: Yes   Attempted to reach patient for transitions of care follow up. Unable to reach patient. Patient: Mila Daily Patient : 1940 MRN: [id number]    Last Discharge [address] [place] [address]       Complaint Diagnosis Description Type Department Provider    22   Admission (Discharged) Madhavi Dobson MD        24 Hour Transition of Care Call:    2nd Attempt to contact patient for 24 Hour Transition Call - (Discharged from Hospital to Home)  Patient was not reached. No answer - Unable to leave message. Called PCP office and spoke with Maralee Litten and patient has appointment 2022 1 pm - sandra will call patient to let her know of appointment. FINAL CALL    Russell Skinner, ESTEBAN    [phone]  [employer] / [place] [address] Coordinator           Was this an external facility discharge?  No Discharge Facility:     Noted following upcoming appointments from discharge chart review:   [facility] follow up appointment(s):   Future Appointments   Date Time Provider Tim Villanueva   2022  2:20 PM Juanjose Rosales MD [facility]     [id number] [address] follow up appointment(s):

## 2022-07-26 PROBLEM — R77.8 ELEVATED TROPONIN: Status: RESOLVED | Noted: 2022-06-26 | Resolved: 2022-07-26

## 2022-07-26 PROBLEM — R79.89 ELEVATED TROPONIN: Status: RESOLVED | Noted: 2022-06-26 | Resolved: 2022-07-26

## 2022-09-28 ENCOUNTER — OFFICE VISIT (OUTPATIENT)
Dept: NEPHROLOGY | Age: 82
End: 2022-09-28
Payer: MEDICARE

## 2022-09-28 VITALS
DIASTOLIC BLOOD PRESSURE: 68 MMHG | OXYGEN SATURATION: 97 % | BODY MASS INDEX: 25.02 KG/M2 | HEART RATE: 56 BPM | WEIGHT: 155 LBS | SYSTOLIC BLOOD PRESSURE: 170 MMHG

## 2022-09-28 DIAGNOSIS — I10 HTN (HYPERTENSION), BENIGN: ICD-10-CM

## 2022-09-28 DIAGNOSIS — R80.9 MICROALBUMINURIA: ICD-10-CM

## 2022-09-28 DIAGNOSIS — N18.32 STAGE 3B CHRONIC KIDNEY DISEASE (HCC): Primary | ICD-10-CM

## 2022-09-28 LAB
ANION GAP SERPL CALCULATED.3IONS-SCNC: 9 MMOL/L (ref 4–12)
BUN BLDV-MCNC: 41 MG/DL (ref 7–20)
CALCIUM SERPL-MCNC: 9.2 MG/DL (ref 8.8–10.5)
CHLORIDE BLD-SCNC: 106 MEQ/L (ref 101–111)
CO2: 26 MEQ/L (ref 21–32)
CREAT SERPL-MCNC: 1.59 MG/DL (ref 0.6–1.3)
CREATININE CLEARANCE: 31
GLUCOSE: 92 MG/DL (ref 70–110)
POTASSIUM SERPL-SCNC: 3.9 MEQ/L (ref 3.6–5)
SODIUM BLD-SCNC: 141 MEQ/L (ref 135–145)

## 2022-09-28 PROCEDURE — 1090F PRES/ABSN URINE INCON ASSESS: CPT | Performed by: INTERNAL MEDICINE

## 2022-09-28 PROCEDURE — 1036F TOBACCO NON-USER: CPT | Performed by: INTERNAL MEDICINE

## 2022-09-28 PROCEDURE — G8428 CUR MEDS NOT DOCUMENT: HCPCS | Performed by: INTERNAL MEDICINE

## 2022-09-28 PROCEDURE — 99213 OFFICE O/P EST LOW 20 MIN: CPT | Performed by: INTERNAL MEDICINE

## 2022-09-28 PROCEDURE — G8417 CALC BMI ABV UP PARAM F/U: HCPCS | Performed by: INTERNAL MEDICINE

## 2022-09-28 PROCEDURE — G8400 PT W/DXA NO RESULTS DOC: HCPCS | Performed by: INTERNAL MEDICINE

## 2022-09-28 PROCEDURE — 1123F ACP DISCUSS/DSCN MKR DOCD: CPT | Performed by: INTERNAL MEDICINE

## 2022-09-28 NOTE — PROGRESS NOTES
Pulse 56   Wt 155 lb (70.3 kg)   SpO2 97%   BMI 25.02 kg/m²  Wt Readings from Last 3 Encounters:   09/28/22 155 lb (70.3 kg)   06/28/22 154 lb 8.7 oz (70.1 kg)   08/02/21 164 lb (74.4 kg)        Physical Exam     General -- no distress  Lungs -- clear  Heart -- S1, S2 heard, JVD - no  Abdomen - soft, non-tender  Extremities -- no edema  CNS - awake and alert    Labs, Radiology and Tests    Labs -    2/22           Potassium 4.0           BUN 47           Creatinine 1.46           eGFR 34                       UPCR            UMCR 65                         Renal Ultrasound Scan -- 28/18  Right kidney 5.1 cm and no vascular flow  Left kidney mild hydro and 11.2 cm and a small cyst     Assessment    Renal -patient has CKD stage III 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 No new labs will order new labs  Electrolytes hypokalemia-recheck labs  Essential hypertension running high in the office however at other doctors offices it runs well. Advised home blood pressure monitoring  Hx of atrial fibrillation  Mild microalbuminuria already on lisinopril.   meds reviewed and D/W patient  Follow-up in 6-month    Tests and orders placed this Encounter     No orders of the defined types were placed in this encounter. Brennen Thomas M.D  [facility].

## 2023-03-16 LAB
A/G RATIO: 1.3 (ref 1.5–2.5)
ALBUMIN SERPL-MCNC: 3.8 G/DL (ref 3.5–5)
ALP BLD-CCNC: 94 IU/L (ref 39–118)
ALT SERPL-CCNC: 6 IU/L (ref 10–40)
ANION GAP SERPL CALCULATED.3IONS-SCNC: 6 MMOL/L (ref 4–12)
APPEARANCE: CLEAR
AST SERPL-CCNC: 14 IU/L (ref 15–41)
BACTERIA: ABNORMAL
BILIRUB SERPL-MCNC: 0.5 MG/DL (ref 0.2–1)
BILIRUBIN URINE: NEGATIVE
BUN BLDV-MCNC: 34 MG/DL (ref 7–20)
CALCIUM SERPL-MCNC: 9.2 MG/DL (ref 8.8–10.5)
CHLORIDE BLD-SCNC: 108 MEQ/L (ref 101–111)
CO2: 27 MEQ/L (ref 21–32)
COLOR: YELLOW
CREAT SERPL-MCNC: 1.31 MG/DL (ref 0.6–1.3)
CREATININE CLEARANCE: 39
CREATININE, RANDOM URINE: 85.4 MG/DL
GLUCOSE URINE: NEGATIVE
GLUCOSE: 85 MG/DL (ref 70–110)
KETONES, URINE: NEGATIVE
LEUKOCYTES, UA: ABNORMAL
MICROALBUMIN UR-MCNC: 72.4 MG/L
MICROALBUMIN/CREAT UR-RTO: 84.7 MG/GM (ref 0–30)
MUCUS: PRESENT
NITRITE, URINE: ABNORMAL
PH, URINE: 5.5 (ref 5–8)
POTASSIUM SERPL-SCNC: 4 MEQ/L (ref 3.6–5)
PROTEIN, URINE: ABNORMAL
RBC URINE: ABNORMAL /HPF
SODIUM BLD-SCNC: 141 MEQ/L (ref 135–145)
SPECIFIC GRAVITY UA: 1.02 (ref 1–1.03)
SQUAMOUS EPITHELIAL: ABNORMAL /HPF
TOTAL PROTEIN: 6.8 G/DL (ref 6.2–8)
URINALYSIS REFLEX: YES
URINE HGB: ABNORMAL
UROBILINOGEN, URINE: NORMAL (ref 0.2–1)
WBC URINE: >100 /HPF

## 2023-03-18 LAB — URINE CULTURE REFLEX: NORMAL

## 2023-03-20 ENCOUNTER — TELEPHONE (OUTPATIENT)
Dept: NEPHROLOGY | Age: 83
End: 2023-03-20

## 2023-03-20 NOTE — TELEPHONE ENCOUNTER
----- Message from Harper Mejia MD sent at 3/20/2023  2:52 PM EDT -----  Any signs and symptoms of UTI?

## 2023-03-22 ENCOUNTER — OFFICE VISIT (OUTPATIENT)
Dept: NEPHROLOGY | Age: 83
End: 2023-03-22
Payer: MEDICARE

## 2023-03-22 VITALS
HEART RATE: 51 BPM | OXYGEN SATURATION: 98 % | DIASTOLIC BLOOD PRESSURE: 70 MMHG | SYSTOLIC BLOOD PRESSURE: 122 MMHG | BODY MASS INDEX: 22.76 KG/M2 | WEIGHT: 141 LBS

## 2023-03-22 DIAGNOSIS — I10 HTN (HYPERTENSION), BENIGN: ICD-10-CM

## 2023-03-22 DIAGNOSIS — N18.32 STAGE 3B CHRONIC KIDNEY DISEASE (HCC): Primary | ICD-10-CM

## 2023-03-22 DIAGNOSIS — R80.9 MICROALBUMINURIA: ICD-10-CM

## 2023-03-22 PROCEDURE — 99213 OFFICE O/P EST LOW 20 MIN: CPT | Performed by: INTERNAL MEDICINE

## 2023-03-22 PROCEDURE — G8484 FLU IMMUNIZE NO ADMIN: HCPCS | Performed by: INTERNAL MEDICINE

## 2023-03-22 PROCEDURE — 3074F SYST BP LT 130 MM HG: CPT | Performed by: INTERNAL MEDICINE

## 2023-03-22 PROCEDURE — 1123F ACP DISCUSS/DSCN MKR DOCD: CPT | Performed by: INTERNAL MEDICINE

## 2023-03-22 PROCEDURE — 1090F PRES/ABSN URINE INCON ASSESS: CPT | Performed by: INTERNAL MEDICINE

## 2023-03-22 PROCEDURE — G8400 PT W/DXA NO RESULTS DOC: HCPCS | Performed by: INTERNAL MEDICINE

## 2023-03-22 PROCEDURE — G8427 DOCREV CUR MEDS BY ELIG CLIN: HCPCS | Performed by: INTERNAL MEDICINE

## 2023-03-22 PROCEDURE — 1036F TOBACCO NON-USER: CPT | Performed by: INTERNAL MEDICINE

## 2023-03-22 PROCEDURE — G8420 CALC BMI NORM PARAMETERS: HCPCS | Performed by: INTERNAL MEDICINE

## 2023-03-22 PROCEDURE — 3078F DIAST BP <80 MM HG: CPT | Performed by: INTERNAL MEDICINE

## 2023-03-22 NOTE — PROGRESS NOTES
Lisinopril increased to 10 mg
122/70 (Site: Left Upper Arm, Position: Sitting, Cuff Size: Medium Adult)   Pulse 51   Wt 141 lb (64 kg)   SpO2 98%   BMI 22.76 kg/m²  Wt Readings from Last 3 Encounters:   03/22/23 141 lb (64 kg)   09/28/22 155 lb (70.3 kg)   06/28/22 154 lb 8.7 oz (70.1 kg)        Physical Exam     General -- no distress  Lungs -- clear  Heart -- S1, S2 heard, JVD - no  Abdomen - soft, non-tender  Extremities -- no edema  CNS - awake and alert    Labs, Radiology and Tests    Labs -    2/22 3/23          Potassium 4.0 4.0          BUN 47 34          Creatinine 1.46 1.31          eGFR 34 39                      UPCR            UMCR 65                         Renal Ultrasound Scan -- 28/18  Right kidney 5.1 cm and no vascular flow  Left kidney mild hydro and 11.2 cm and a small cyst     Assessment    Renal -patient has CKD stage III most 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 Electrolytes WNL  Essential hypertension running well. Continue current meds  Hx of atrial fibrillation  Mild microalbuminuria already on lisinopril.   meds reviewed and D/W patient  Follow-up in 6-month    Tests and orders placed this Encounter     Orders Placed This Encounter   Procedures    Protein, urine, random    MICROALBUMIN, RANDOM URINE (W/O CREATININE)    Creatinine, Random Urine    Comprehensive Metabolic Panel         Richard Deshpande M.D  [facility].

## 2023-07-19 LAB
ALBUMIN SERPL-MCNC: 3.9 G/DL
ALP BLD-CCNC: 96 U/L
ALT SERPL-CCNC: 5 U/L
ANION GAP SERPL CALCULATED.3IONS-SCNC: 8 MMOL/L
AST SERPL-CCNC: 15 U/L
BILIRUB SERPL-MCNC: 0.6 MG/DL (ref 0.1–1.4)
BUN BLDV-MCNC: 51 MG/DL
CALCIUM SERPL-MCNC: 9.2 MG/DL
CHLORIDE BLD-SCNC: 108 MMOL/L
CO2: 29 MMOL/L
CREAT SERPL-MCNC: 1.73 MG/DL
EGFR: 28
GLUCOSE BLD-MCNC: 95 MG/DL
POTASSIUM SERPL-SCNC: 4.4 MMOL/L
SODIUM BLD-SCNC: 145 MMOL/L
TOTAL PROTEIN: 6.7

## 2023-07-21 ENCOUNTER — TELEPHONE (OUTPATIENT)
Dept: NEPHROLOGY | Age: 83
End: 2023-07-21

## 2023-07-21 DIAGNOSIS — N18.32 STAGE 3B CHRONIC KIDNEY DISEASE (HCC): Primary | ICD-10-CM

## 2023-07-21 NOTE — TELEPHONE ENCOUNTER
The patient will Lasix over the weekend drink at least 60 ounces of fluids every day restart Lasix on Monday   Repeat BUN and creatinine in 1 week

## 2023-07-25 NOTE — TELEPHONE ENCOUNTER
Francesca called I explained to her what needed done. She said okay.  She will go to the lab this week to get her lab done at

## 2023-07-31 LAB
BUN BLDV-MCNC: 43 MG/DL (ref 7–20)
CREAT SERPL-MCNC: 1.57 MG/DL (ref 0.6–1.3)
CREATININE CLEARANCE: 31

## 2023-09-28 LAB
A/G RATIO: 1.3 (ref 1.5–2.5)
ALBUMIN SERPL-MCNC: 4 G/DL (ref 3.5–5)
ALP BLD-CCNC: 96 IU/L (ref 39–118)
ALT SERPL-CCNC: 5 IU/L (ref 10–40)
ANION GAP SERPL CALCULATED.3IONS-SCNC: 9 MMOL/L (ref 4–12)
AST SERPL-CCNC: 13 IU/L (ref 15–41)
BILIRUB SERPL-MCNC: 0.6 MG/DL (ref 0.2–1)
BUN BLDV-MCNC: 55 MG/DL (ref 7–20)
CALCIUM SERPL-MCNC: 9.5 MG/DL (ref 8.8–10.5)
CHLORIDE BLD-SCNC: 107 MEQ/L (ref 101–111)
CO2: 26 MEQ/L (ref 21–32)
CREAT SERPL-MCNC: 1.79 MG/DL (ref 0.6–1.3)
CREATININE CLEARANCE: 27
CREATININE, RANDOM URINE: 31.1 MG/DL
GLUCOSE: 85 MG/DL (ref 70–110)
MICROALBUMIN UR-MCNC: 19.5 MG/L
MICROALBUMIN/CREAT UR-RTO: 62.7 MG/GM (ref 0–30)
POTASSIUM SERPL-SCNC: 4 MEQ/L (ref 3.6–5)
PROTEIN, URINE, RANDOM: 7.6 MG/DL
SODIUM BLD-SCNC: 142 MEQ/L (ref 135–145)
TOTAL PROTEIN: 7.2 G/DL (ref 6.2–8)

## 2023-10-04 ENCOUNTER — OFFICE VISIT (OUTPATIENT)
Dept: NEPHROLOGY | Age: 83
End: 2023-10-04
Payer: MEDICARE

## 2023-10-04 VITALS
HEART RATE: 62 BPM | SYSTOLIC BLOOD PRESSURE: 124 MMHG | DIASTOLIC BLOOD PRESSURE: 60 MMHG | OXYGEN SATURATION: 99 % | BODY MASS INDEX: 23.24 KG/M2 | WEIGHT: 144 LBS

## 2023-10-04 DIAGNOSIS — N26.1 ATROPHY OF RIGHT KIDNEY: ICD-10-CM

## 2023-10-04 DIAGNOSIS — I10 HTN (HYPERTENSION), BENIGN: ICD-10-CM

## 2023-10-04 DIAGNOSIS — N17.9 ACUTE RENAL FAILURE, UNSPECIFIED ACUTE RENAL FAILURE TYPE (HCC): ICD-10-CM

## 2023-10-04 DIAGNOSIS — N18.32 STAGE 3B CHRONIC KIDNEY DISEASE (HCC): Primary | ICD-10-CM

## 2023-10-04 DIAGNOSIS — R80.9 MICROALBUMINURIA: ICD-10-CM

## 2023-10-04 PROCEDURE — G8427 DOCREV CUR MEDS BY ELIG CLIN: HCPCS | Performed by: INTERNAL MEDICINE

## 2023-10-04 PROCEDURE — G8400 PT W/DXA NO RESULTS DOC: HCPCS | Performed by: INTERNAL MEDICINE

## 2023-10-04 PROCEDURE — 1090F PRES/ABSN URINE INCON ASSESS: CPT | Performed by: INTERNAL MEDICINE

## 2023-10-04 PROCEDURE — 3074F SYST BP LT 130 MM HG: CPT | Performed by: INTERNAL MEDICINE

## 2023-10-04 PROCEDURE — 1123F ACP DISCUSS/DSCN MKR DOCD: CPT | Performed by: INTERNAL MEDICINE

## 2023-10-04 PROCEDURE — 1036F TOBACCO NON-USER: CPT | Performed by: INTERNAL MEDICINE

## 2023-10-04 PROCEDURE — G8420 CALC BMI NORM PARAMETERS: HCPCS | Performed by: INTERNAL MEDICINE

## 2023-10-04 PROCEDURE — 3078F DIAST BP <80 MM HG: CPT | Performed by: INTERNAL MEDICINE

## 2023-10-04 PROCEDURE — G8484 FLU IMMUNIZE NO ADMIN: HCPCS | Performed by: INTERNAL MEDICINE

## 2023-10-04 PROCEDURE — 99213 OFFICE O/P EST LOW 20 MIN: CPT | Performed by: INTERNAL MEDICINE

## 2023-10-04 NOTE — PROGRESS NOTES
[facility]        Outpatient Follow-Up note         10/4/2023 10:25 AM    Patient Name:   Ina Ceja:    1940  Primary Care Physician:  Betina Mobley, [address] [address]  Dept: [phone]  Loc: [phone]     Chief Complaint / Reason for follow-up : Follow Up of CKD     Interval History :  Patient seen and examined by me. Feels well. No cp or SOB.   Was in the hospital for a UTI     Past History :  Past Medical History:   Diagnosis Date    A-fib [facility])     Cerebral artery occlusion with cerebral infarction ([address] )     GERD (gastroesophageal reflux disease)      Past Surgical History:   Procedure Laterality Date    COLONOSCOPY      MASTECTOMY Left 2011        Medications :     Outpatient Medications Marked as Taking for the 10/4/23 encounter (Office Visit) with Da Orellana MD   Medication Sig Dispense Refill    vitamin D 25 MCG (1000 UT) CAPS Take 1 capsule by mouth daily      apixaban (ELIQUIS) 5 MG TABS tablet Take 1 tablet by mouth in the morning and at bedtime 60 tablet 3    furosemide (LASIX) 40 MG tablet Take 1 tablet by mouth daily 60 tablet 3    acetaminophen (TYLENOL) 325 MG tablet Take 500 mg by mouth every 6 hours as needed for Pain      melatonin 3 MG TABS tablet Take 1 tablet by mouth nightly as needed      lisinopril (PRINIVIL;ZESTRIL) 5 MG tablet Take 1 tablet by mouth daily (Patient taking differently: Take 2 tablets by mouth daily) 90 tablet 1    Omeprazole (PRILOSEC PO) Take 20 mg by mouth daily      metoprolol succinate (TOPROL XL) 25 MG extended release tablet Take 1 tablet by mouth daily      clopidogrel (PLAVIX) 75 MG tablet Take 1 tablet by mouth daily      isosorbide mononitrate (IMDUR) 30 MG extended release tablet Take 1 tablet by mouth daily      atorvastatin (LIPITOR) 10 MG tablet Take 1 tablet by mouth every evening

## 2023-10-11 LAB
ANION GAP SERPL CALCULATED.3IONS-SCNC: 10 MMOL/L (ref 4–12)
BUN BLDV-MCNC: 43 MG/DL (ref 7–20)
CALCIUM SERPL-MCNC: 8.6 MG/DL (ref 8.8–10.5)
CHLORIDE BLD-SCNC: 117 MEQ/L (ref 101–111)
CO2: 24 MEQ/L (ref 21–32)
CREAT SERPL-MCNC: 1.37 MG/DL (ref 0.6–1.3)
CREATININE CLEARANCE: 37
GLUCOSE: 91 MG/DL (ref 70–110)
POTASSIUM SERPL-SCNC: 4.4 MEQ/L (ref 3.6–5)
SODIUM BLD-SCNC: 151 MEQ/L (ref 135–145)

## 2023-10-12 ENCOUNTER — TELEPHONE (OUTPATIENT)
Dept: NEPHROLOGY | Age: 83
End: 2023-10-12

## 2023-10-12 DIAGNOSIS — I10 HTN (HYPERTENSION), BENIGN: ICD-10-CM

## 2023-10-12 DIAGNOSIS — N18.32 STAGE 3B CHRONIC KIDNEY DISEASE (HCC): Primary | ICD-10-CM

## 2023-10-12 NOTE — TELEPHONE ENCOUNTER
Marlen Curtis MD  [facility] Clinical Staff  Patients sodium is higher . Hold lasix for 3 days and drink more liquids at least 60 ounces per day. BMP in 1 week       Called patient and had to leave a message to return my call.  Lab orders have been signed

## 2023-10-18 ENCOUNTER — TELEPHONE (OUTPATIENT)
Dept: NEPHROLOGY | Age: 83
End: 2023-10-18

## 2023-10-18 DIAGNOSIS — N18.32 STAGE 3B CHRONIC KIDNEY DISEASE (HCC): Primary | ICD-10-CM

## 2023-10-18 DIAGNOSIS — I10 HTN (HYPERTENSION), BENIGN: ICD-10-CM

## 2023-10-18 NOTE — TELEPHONE ENCOUNTER
Pt states her feet have been swollen since dr Christoph Kay cut her lasix down to 20 mg qd on 10-4 wants to know if she can take the whole tablet again (40 mg qd)?

## 2023-11-01 LAB
ANION GAP SERPL CALCULATED.3IONS-SCNC: 7 MMOL/L (ref 4–12)
BUN BLDV-MCNC: 44 MG/DL (ref 7–20)
CALCIUM SERPL-MCNC: 9.4 MG/DL (ref 8.8–10.5)
CHLORIDE BLD-SCNC: 105 MEQ/L (ref 101–111)
CO2: 28 MEQ/L (ref 21–32)
CREAT SERPL-MCNC: 1.65 MG/DL (ref 0.6–1.3)
CREATININE CLEARANCE: 30
GLUCOSE: 95 MG/DL (ref 70–110)
POTASSIUM SERPL-SCNC: 3.8 MEQ/L (ref 3.6–5)
SODIUM BLD-SCNC: 140 MEQ/L (ref 135–145)

## 2024-05-01 ENCOUNTER — OFFICE VISIT (OUTPATIENT)
Dept: NEPHROLOGY | Age: 84
End: 2024-05-01
Payer: MEDICARE

## 2024-05-01 VITALS — DIASTOLIC BLOOD PRESSURE: 60 MMHG | SYSTOLIC BLOOD PRESSURE: 130 MMHG | HEART RATE: 74 BPM | OXYGEN SATURATION: 94 %

## 2024-05-01 DIAGNOSIS — N26.1 ATROPHY OF RIGHT KIDNEY: ICD-10-CM

## 2024-05-01 DIAGNOSIS — N18.32 STAGE 3B CHRONIC KIDNEY DISEASE (HCC): Primary | ICD-10-CM

## 2024-05-01 DIAGNOSIS — I10 HTN (HYPERTENSION), BENIGN: ICD-10-CM

## 2024-05-01 PROCEDURE — G8420 CALC BMI NORM PARAMETERS: HCPCS | Performed by: INTERNAL MEDICINE

## 2024-05-01 PROCEDURE — 1036F TOBACCO NON-USER: CPT | Performed by: INTERNAL MEDICINE

## 2024-05-01 PROCEDURE — 1090F PRES/ABSN URINE INCON ASSESS: CPT | Performed by: INTERNAL MEDICINE

## 2024-05-01 PROCEDURE — 1123F ACP DISCUSS/DSCN MKR DOCD: CPT | Performed by: INTERNAL MEDICINE

## 2024-05-01 PROCEDURE — 99214 OFFICE O/P EST MOD 30 MIN: CPT | Performed by: INTERNAL MEDICINE

## 2024-05-01 PROCEDURE — G8427 DOCREV CUR MEDS BY ELIG CLIN: HCPCS | Performed by: INTERNAL MEDICINE

## 2024-05-01 RX ORDER — FUROSEMIDE 40 MG/1
20 TABLET ORAL DAILY
Qty: 60 TABLET | Refills: 3 | Status: SHIPPED | OUTPATIENT
Start: 2024-05-01

## 2024-05-01 NOTE — PROGRESS NOTES
[facility]        Outpatient Follow-Up note         5/1/2024 10:29 AM    Patient Name:   Francesca Rai  YOB: 1940  Primary Care Physician:  Weston Cabrera MD   [facility] [place] [facility]  [facility]  [address] [address]  Dept: [phone]  Loc: [phone]     Chief Complaint / Reason for follow-up : Follow Up of CKD     Interval History :  Patient seen and examined by me.   Feels well. No cp or SOB.  Was in the hospital for a UTI     Past History :  Past Medical History:   Diagnosis Date    A-fib (HCC)     Cerebral artery occlusion with cerebral infarction (HCC)     GERD (gastroesophageal reflux disease)      Past Surgical History:   Procedure Laterality Date    COLONOSCOPY      MASTECTOMY Left 2011        Medications :     Outpatient Medications Marked as Taking for the 5/1/24 encounter (Office Visit) with Chucky Eden MD   Medication Sig Dispense Refill    vitamin D 25 MCG (1000 UT) CAPS Take 1 capsule by mouth daily      apixaban (ELIQUIS) 5 MG TABS tablet Take 1 tablet by mouth in the morning and at bedtime 60 tablet 3    furosemide (LASIX) 40 MG tablet Take 1 tablet by mouth daily 60 tablet 3    acetaminophen (TYLENOL) 325 MG tablet Take 500 mg by mouth every 6 hours as needed for Pain      melatonin 3 MG TABS tablet Take 1 tablet by mouth nightly as needed      lisinopril (PRINIVIL;ZESTRIL) 5 MG tablet Take 1 tablet by mouth daily (Patient taking differently: Take 2 tablets by mouth daily) 90 tablet 1    Omeprazole (PRILOSEC PO) Take 20 mg by mouth daily      metoprolol succinate (TOPROL XL) 25 MG extended release tablet Take 1 tablet by mouth daily      clopidogrel (PLAVIX) 75 MG tablet Take 1 tablet by mouth daily      isosorbide mononitrate (IMDUR) 30 MG extended release tablet Take 1 tablet by mouth daily      atorvastatin (LIPITOR) 10 MG tablet Take 1 tablet by mouth every evening

## 2024-09-11 LAB
ALBUMIN: 3.8 G/DL
ALP BLD-CCNC: 178 U/L
ALT SERPL-CCNC: 14 U/L
ANION GAP SERPL CALCULATED.3IONS-SCNC: NORMAL MMOL/L
AST SERPL-CCNC: 23 U/L
BILIRUB SERPL-MCNC: 0.7 MG/DL (ref 0.1–1.4)
BUN BLDV-MCNC: 24 MG/DL
CALCIUM SERPL-MCNC: 9 MG/DL
CHLORIDE BLD-SCNC: 105 MMOL/L
CO2: 24 MMOL/L
CREAT SERPL-MCNC: 1.82 MG/DL
GFR, ESTIMATED: 26
GLUCOSE BLD-MCNC: 91 MG/DL
POTASSIUM SERPL-SCNC: 4.4 MMOL/L
SODIUM BLD-SCNC: 138 MMOL/L
TOTAL PROTEIN: 7 G/DL (ref 6.4–8.2)

## 2024-10-21 LAB
ANION GAP SERPL CALCULATED.3IONS-SCNC: 8 MMOL/L (ref 4–12)
BASOPHILS ABSOLUTE: 0 /CMM (ref 0–200)
BASOPHILS RELATIVE PERCENT: 0.8 % (ref 0–2)
BUN BLDV-MCNC: 49 MG/DL (ref 7–20)
CALCIUM SERPL-MCNC: 9.1 MG/DL (ref 8.8–10.5)
CHLORIDE BLD-SCNC: 105 MEQ/L (ref 101–111)
CO2: 28 MEQ/L (ref 21–32)
CREAT SERPL-MCNC: 1.74 MG/DL (ref 0.6–1.3)
CREATININE CLEARANCE: 28
EOSINOPHILS ABSOLUTE: 100 /CMM (ref 0–500)
EOSINOPHILS RELATIVE PERCENT: 2.4 % (ref 0–6)
GLUCOSE: 96 MG/DL (ref 70–110)
HCT VFR BLD CALC: 34.4 % (ref 35–44)
HEMOGLOBIN: 10.9 GM/DL (ref 12–15)
LYMPHOCYTES ABSOLUTE: 1200 /CMM (ref 1000–4800)
LYMPHOCYTES RELATIVE PERCENT: 21.7 % (ref 15–45)
MCH RBC QN AUTO: 28.9 PG (ref 27.5–33)
MCHC RBC AUTO-ENTMCNC: 31.7 GM/DL (ref 33–36)
MCV RBC AUTO: 91.2 CU MIC (ref 80–97)
MONOCYTES ABSOLUTE: 600 /CMM (ref 0–800)
MONOCYTES RELATIVE PERCENT: 10.6 % (ref 2–10)
NEUTROPHILS ABSOLUTE: 3500 /CMM (ref 1800–7700)
NEUTROPHILS RELATIVE PERCENT: 64.5 % (ref 40–70)
NUCLEATED RBCS: 0.1 /100 WBC
PDW BLD-RTO: 15.2 % (ref 12–16)
PHOSPHORUS: 3.8 MG/DL (ref 2.4–4.7)
PLATELET # BLD: 211 TH/CMM (ref 150–400)
POTASSIUM SERPL-SCNC: 4.3 MEQ/L (ref 3.6–5)
PTH INTACT: 260.8 PG/ML (ref 12–88)
RBC # BLD: 3.78 MIL/CMM (ref 4–5.1)
SODIUM BLD-SCNC: 141 MEQ/L (ref 135–145)
VITAMIN D 25-HYDROXY: 37 NG/ML (ref 30–100)
WBC # BLD: 5.4 TH/CMM (ref 4.4–10.5)

## 2024-11-06 ENCOUNTER — OFFICE VISIT (OUTPATIENT)
Dept: NEPHROLOGY | Age: 84
End: 2024-11-06

## 2024-11-06 VITALS
OXYGEN SATURATION: 98 % | HEART RATE: 64 BPM | SYSTOLIC BLOOD PRESSURE: 158 MMHG | HEIGHT: 66 IN | DIASTOLIC BLOOD PRESSURE: 86 MMHG | BODY MASS INDEX: 23.3 KG/M2 | WEIGHT: 145 LBS

## 2024-11-06 DIAGNOSIS — N18.32 STAGE 3B CHRONIC KIDNEY DISEASE (HCC): Primary | ICD-10-CM

## 2024-11-06 DIAGNOSIS — I10 HTN (HYPERTENSION), BENIGN: ICD-10-CM

## 2024-11-06 DIAGNOSIS — N25.81 SECONDARY HYPERPARATHYROIDISM (HCC): ICD-10-CM

## 2024-11-06 DIAGNOSIS — N26.1 ATROPHY OF RIGHT KIDNEY: ICD-10-CM

## 2024-11-06 PROBLEM — N17.9 AKI (ACUTE KIDNEY INJURY) (HCC): Status: ACTIVE | Noted: 2022-06-22

## 2024-11-06 PROBLEM — S72.001A CLOSED DISPLACED FRACTURE OF RIGHT FEMORAL NECK (HCC): Status: ACTIVE | Noted: 2022-06-20

## 2024-11-06 PROBLEM — N39.0 URINARY TRACT INFECTION: Status: ACTIVE | Noted: 2024-11-06

## 2024-11-06 PROBLEM — R06.09 DYSPNEA ON EXERTION: Status: ACTIVE | Noted: 2024-11-06

## 2024-11-06 PROBLEM — S37.009A INJURY OF KIDNEY: Status: ACTIVE | Noted: 2024-11-06

## 2024-11-06 PROBLEM — R94.39 ABNORMAL THALLIUM STRESS TEST: Status: ACTIVE | Noted: 2024-11-06

## 2024-11-06 PROBLEM — M05.79 RHEUMATOID ARTHRITIS INVOLVING MULTIPLE SITES WITH POSITIVE RHEUMATOID FACTOR (HCC): Status: ACTIVE | Noted: 2020-07-26

## 2024-11-06 PROBLEM — K92.2 GASTROINTESTINAL HEMORRHAGE: Status: ACTIVE | Noted: 2024-11-06

## 2024-11-06 RX ORDER — SOTAGLIFLOZIN 200 MG/1
TABLET ORAL
COMMUNITY
Start: 2024-10-25

## 2024-11-06 RX ORDER — CALCITRIOL 0.25 UG/1
0.25 CAPSULE, LIQUID FILLED ORAL DAILY
Qty: 30 CAPSULE | Refills: 3 | Status: SHIPPED | OUTPATIENT
Start: 2024-11-06

## 2024-11-06 RX ORDER — LISINOPRIL 10 MG/1
10 TABLET ORAL DAILY
COMMUNITY
Start: 2024-09-27

## 2024-11-06 NOTE — PROGRESS NOTES
[facility]        Outpatient Follow-Up note         11/6/2024 11:23 AM    Patient Name:   Francesca Rai  YOB: 1940  Primary Care Physician:  Weston Cabrera MD   [facility] [place] [facility]  [facility]  [address] [facility] [address]  Dept: [phone]  Loc: [phone]     Chief Complaint / Reason for follow-up : Follow Up of CKD     Interval History :  Patient seen and examined by me.   Feels well. No cp or SOB.  No hospitalizations and no med changes      Past History :  Past Medical History:   Diagnosis Date    A-fib (HCC)     Cerebral artery occlusion with cerebral infarction (HCC)     GERD (gastroesophageal reflux disease)      Past Surgical History:   Procedure Laterality Date    COLONOSCOPY      MASTECTOMY Left 2011        Medications :     Outpatient Medications Marked as Taking for the 11/6/24 encounter (Office Visit) with Chucky Eden MD   Medication Sig Dispense Refill    lisinopril (PRINIVIL;ZESTRIL) 10 MG tablet Take 1 tablet by mouth daily      INPEFA 200 MG TABS TAKE 1 TABLET BY MOUTH NO MORE THAN 1 HOUR BEFORE THE FIRST MEAL OF THE DAY      furosemide (LASIX) 40 MG tablet Take 0.5 tablets by mouth daily 60 tablet 3    vitamin D 25 MCG (1000 UT) CAPS Take 1 capsule by mouth daily      apixaban (ELIQUIS) 5 MG TABS tablet Take 1 tablet by mouth in the morning and at bedtime 60 tablet 3    acetaminophen (TYLENOL) 325 MG tablet Take 500 mg by mouth every 6 hours as needed for Pain      melatonin 3 MG TABS tablet Take 1 tablet by mouth nightly as needed      Omeprazole (PRILOSEC PO) Take 20 mg by mouth daily      metoprolol succinate (TOPROL XL) 25 MG extended release tablet Take 1 tablet by mouth daily      clopidogrel (PLAVIX) 75 MG tablet Take 1 tablet by mouth daily      isosorbide mononitrate (IMDUR) 30 MG extended release tablet Take 1 tablet by mouth in the morning and at bedtime

## 2024-11-06 NOTE — PROGRESS NOTES
Francesca said that before Dr. Eden cut her lasix back to 40 mg 1/2 tab every day her blood pressure was fine.  Now her blood pressure is running a little high. It was changed at her last visit.  NO other medication changes and she has not been in the hospital recently.

## 2024-11-11 DIAGNOSIS — N26.1 ATROPHY OF RIGHT KIDNEY: ICD-10-CM

## 2024-11-11 DIAGNOSIS — N25.81 SECONDARY HYPERPARATHYROIDISM (HCC): ICD-10-CM

## 2024-11-11 DIAGNOSIS — N18.32 STAGE 3B CHRONIC KIDNEY DISEASE (HCC): ICD-10-CM

## 2024-11-11 DIAGNOSIS — I10 HTN (HYPERTENSION), BENIGN: ICD-10-CM

## 2024-11-12 ENCOUNTER — TELEPHONE (OUTPATIENT)
Dept: NEPHROLOGY | Age: 84
End: 2024-11-12

## 2024-11-12 NOTE — TELEPHONE ENCOUNTER
----- Message from Dr. Chucky Eden MD sent at 11/12/2024  2:33 PM EST -----  Please let the patient know that I have reviewed the kidney US scan result and it is normal, except atrophic right kidney

## 2024-11-12 NOTE — RESULT ENCOUNTER NOTE
Please let the patient know that I have reviewed the kidney US scan result and it is normal, except atrophic right kidney

## 2024-12-06 PROBLEM — N39.0 URINARY TRACT INFECTION: Status: RESOLVED | Noted: 2024-11-06 | Resolved: 2024-12-06

## 2025-04-14 DIAGNOSIS — N25.81 SECONDARY HYPERPARATHYROIDISM: ICD-10-CM

## 2025-04-15 LAB
ANION GAP SERPL CALCULATED.3IONS-SCNC: 10 MMOL/L (ref 4–12)
APPEARANCE: ABNORMAL
BACTERIA: ABNORMAL
BASOPHILS ABSOLUTE: 0 /CMM (ref 0–200)
BASOPHILS RELATIVE PERCENT: 1 % (ref 0–2)
BILIRUBIN, URINE: NEGATIVE
BUN BLDV-MCNC: 38 MG/DL (ref 7–20)
CALCIUM SERPL-MCNC: 8.5 MG/DL (ref 8.8–10.5)
CHLORIDE BLD-SCNC: 109 MEQ/L (ref 101–111)
CO2: 23 MEQ/L (ref 21–32)
COLOR, UA: YELLOW
CREAT SERPL-MCNC: 1.44 MG/DL (ref 0.6–1.3)
CREATININE CLEARANCE: 34
CREATININE, RANDOM URINE: 87 MG/DL
CREATININE, RANDOM URINE: 87.2 MG/DL
EOSINOPHILS ABSOLUTE: 100 /CMM (ref 0–500)
EOSINOPHILS RELATIVE PERCENT: 2.6 % (ref 0–6)
GLUCOSE URINE: ABNORMAL
GLUCOSE: 93 MG/DL (ref 70–110)
HCT VFR BLD CALC: 31.6 % (ref 35–44)
HEMOGLOBIN: 10.5 GM/DL (ref 12–15)
KETONES, URINE: NEGATIVE
LEUKOCYTES, UA: ABNORMAL
LYMPHOCYTES ABSOLUTE: 900 /CMM (ref 1000–4800)
LYMPHOCYTES RELATIVE PERCENT: 17.1 % (ref 15–45)
MCH RBC QN AUTO: 29.6 PG (ref 27.5–33)
MCHC RBC AUTO-ENTMCNC: 33.2 GM/DL (ref 33–36)
MCV RBC AUTO: 89.3 CU MIC (ref 80–97)
MICROALBUMIN/CREAT 24H UR: 87.1 MG/L
MICROALBUMIN/CREAT UR-RTO: 100.1 MG/GM (ref 0–30)
MONOCYTES ABSOLUTE: 500 /CMM (ref 0–800)
MONOCYTES RELATIVE PERCENT: 9.4 % (ref 2–10)
MUCUS: PRESENT
NEUTROPHILS ABSOLUTE: 3600 /CMM (ref 1800–7700)
NEUTROPHILS RELATIVE PERCENT: 69.9 % (ref 40–70)
NITRITE, URINE: ABNORMAL
NUCLEATED RBCS: 0.1 /100 WBC
PDW BLD-RTO: 15.4 % (ref 12–16)
PH, URINE: 5.5 (ref 5–8)
PHOSPHORUS: 3.3 MG/DL (ref 2.4–4.7)
PLATELET # BLD: 196 TH/CMM (ref 150–400)
POTASSIUM SERPL-SCNC: 4 MEQ/L (ref 3.6–5)
PROTEIN, URINE, RANDOM: 38.8 MG/DL
PROTEIN, URINE: ABNORMAL
PTH INTACT: 162.8 PG/ML (ref 12–88)
RBC # BLD: 3.54 MIL/CMM (ref 4–5.1)
RBC URINE: ABNORMAL /HPF
SODIUM BLD-SCNC: 142 MEQ/L (ref 135–145)
SPECIFIC GRAVITY UA: 1.01 (ref 1–1.03)
SQUAMOUS EPITHELIAL: ABNORMAL /HPF
URINALYSIS REFLEX: YES
URINE HGB: ABNORMAL
UROBILINOGEN, URINE: ABNORMAL (ref 0.2–1)
VITAMIN D 25-HYDROXY: 30.7 NG/ML (ref 30–100)
WBC # BLD: 5.2 TH/CMM (ref 4.4–10.5)
WBC CLUMPS: PRESENT /HPF
WBC URINE: >100 /HPF

## 2025-04-15 RX ORDER — CALCITRIOL 0.25 UG/1
0.25 CAPSULE, LIQUID FILLED ORAL DAILY
Qty: 30 CAPSULE | Refills: 5 | Status: SHIPPED | OUTPATIENT
Start: 2025-04-15

## 2025-04-18 LAB — URINE CULTURE REFLEX: NORMAL

## 2025-04-21 ENCOUNTER — RESULTS FOLLOW-UP (OUTPATIENT)
Dept: NEPHROLOGY | Age: 85
End: 2025-04-21

## 2025-05-07 ENCOUNTER — OFFICE VISIT (OUTPATIENT)
Dept: NEPHROLOGY | Age: 85
End: 2025-05-07
Payer: MEDICARE

## 2025-05-07 VITALS
WEIGHT: 145 LBS | OXYGEN SATURATION: 98 % | DIASTOLIC BLOOD PRESSURE: 80 MMHG | SYSTOLIC BLOOD PRESSURE: 160 MMHG | HEART RATE: 71 BPM | BODY MASS INDEX: 23.76 KG/M2

## 2025-05-07 DIAGNOSIS — N25.81 SECONDARY HYPERPARATHYROIDISM: ICD-10-CM

## 2025-05-07 DIAGNOSIS — N26.1 ATROPHY OF RIGHT KIDNEY: ICD-10-CM

## 2025-05-07 DIAGNOSIS — I10 HTN (HYPERTENSION), BENIGN: ICD-10-CM

## 2025-05-07 DIAGNOSIS — N18.32 STAGE 3B CHRONIC KIDNEY DISEASE (HCC): Primary | ICD-10-CM

## 2025-05-07 PROCEDURE — G2211 COMPLEX E/M VISIT ADD ON: HCPCS | Performed by: INTERNAL MEDICINE

## 2025-05-07 PROCEDURE — 1036F TOBACCO NON-USER: CPT | Performed by: INTERNAL MEDICINE

## 2025-05-07 PROCEDURE — 99213 OFFICE O/P EST LOW 20 MIN: CPT | Performed by: INTERNAL MEDICINE

## 2025-05-07 PROCEDURE — 1090F PRES/ABSN URINE INCON ASSESS: CPT | Performed by: INTERNAL MEDICINE

## 2025-05-07 PROCEDURE — 1159F MED LIST DOCD IN RCRD: CPT | Performed by: INTERNAL MEDICINE

## 2025-05-07 PROCEDURE — G8427 DOCREV CUR MEDS BY ELIG CLIN: HCPCS | Performed by: INTERNAL MEDICINE

## 2025-05-07 PROCEDURE — G8420 CALC BMI NORM PARAMETERS: HCPCS | Performed by: INTERNAL MEDICINE

## 2025-05-07 PROCEDURE — 1123F ACP DISCUSS/DSCN MKR DOCD: CPT | Performed by: INTERNAL MEDICINE

## 2025-05-07 NOTE — PROGRESS NOTES
[facility]        Outpatient Follow-Up note         5/7/2025 11:17 AM    Patient Name:   Francesca Rai  YOB: 1940  Primary Care Physician:  Weston Cabrera MD   [facility] [place] [facility]  [facility]  [address] [facility] [address]  Dept: [phone]  Loc: [phone]     Chief Complaint / Reason for follow-up : Follow Up of CKD     Interval History :  Patient seen and examined by me.   Feels well. No cp or SOB.  No hospitalizations . Imdur increased and not taking calcitriol     Past History :  Past Medical History:   Diagnosis Date    A-fib (HCC)     Cerebral artery occlusion with cerebral infarction (HCC)     GERD (gastroesophageal reflux disease)      Past Surgical History:   Procedure Laterality Date    COLONOSCOPY      MASTECTOMY Left 2011        Medications :     Outpatient Medications Marked as Taking for the 5/7/25 encounter (Office Visit) with Chucky Eden MD   Medication Sig Dispense Refill    lisinopril (PRINIVIL;ZESTRIL) 10 MG tablet Take 1 tablet by mouth daily      furosemide (LASIX) 40 MG tablet Take 0.5 tablets by mouth daily 60 tablet 3    vitamin D 25 MCG (1000 UT) CAPS Take 1 capsule by mouth daily      apixaban (ELIQUIS) 5 MG TABS tablet Take 1 tablet by mouth in the morning and at bedtime 60 tablet 3    melatonin 3 MG TABS tablet Take 1 tablet by mouth nightly as needed      metoprolol succinate (TOPROL XL) 25 MG extended release tablet Take 1 tablet by mouth daily      clopidogrel (PLAVIX) 75 MG tablet Take 1 tablet by mouth daily      isosorbide mononitrate (IMDUR) 30 MG extended release tablet Take 2 tablets by mouth in the morning and at bedtime      atorvastatin (LIPITOR) 10 MG tablet Take 1 tablet by mouth every evening         Vitals     BP (!) 160/80 (BP Site: Left Upper Arm, Patient Position: Sitting, BP Cuff Size: Medium Adult)   Pulse 71   Wt 65.8 kg (145 lb)   SpO2 98%

## 2025-06-23 NOTE — PROGRESS NOTES
[facility] RHEUMATOLOGY FOLLOW UP NOTE       Date Of Service: 3/10/2021  Provider: AJAY Wren - JENIFER    Name: Humberto Spangler   MRN: [id number]    Chief Complaint(s)     Chief Complaint   Patient presents with    Follow-up     6 week        History of Present Illness (HPI)     Humberto Spangler  is a(n)81 y.o. female with a hx of HTN, hypercholesteremia, CVA, a fib, gout, anemia, rheumatoid arthritis, CAD s/p stenting, GERD, osteoarthritis, allergic rhinitis, cholelithiasis, hiatal hernia, diverticulosis, HLD, CKD stage III, hyperlipidemia, GI bleed, breast cancer here for the f/u evaluation of rheumatoid arthritis. Interval hx:    - did not start arava or prednisone    pain affecting the hands, wrists, left shoulder, left knee, right ankle, neck, back  Pain on a scale 0-10: 10/10  Type of pain: constant  Timing:night time  Aggravating factors: n/a  Alleviating factors: OTC gels, creams    Associated symptoms:  + swelling/  Redness/ warmth (hands, right ankle), + AM stiffness lasting ~ all day      REVIEW OF SYSTEMS: (ROS)    Review of Systems   Constitutional: Negative for fatigue, fever and unexpected weight change. HENT: Positive for tinnitus. Negative for congestion and trouble swallowing. Hairs loss, dry mouth   Eyes: Negative for pain and itching. Respiratory: Positive for shortness of breath. Negative for cough. Cardiovascular: Negative for chest pain and leg swelling. Gastrointestinal: Negative for abdominal pain, constipation, diarrhea and nausea. Endocrine: Negative for cold intolerance and heat intolerance. Genitourinary: Negative for difficulty urinating, frequency and urgency. Musculoskeletal: Positive for arthralgias, back pain and joint swelling. Skin: Negative for rash. Neurological: Positive for numbness. Negative for dizziness, weakness and headaches. Psychiatric/Behavioral: The patient is not nervous/anxious.         PAST MEDICAL HISTORY Past Medical History:   Diagnosis Date    A-fib [facility])        PAST SURGICAL HISTORY      Past Surgical History:   Procedure Laterality Date    MASTECTOMY Left 2011       FAMILY HISTORY      Family History   Problem Relation Age of Onset    Heart Disease Mother     Liver Disease Father     Heart Disease Sister     Heart Disease Brother     Diabetes Brother     Heart Disease Sister     Diabetes Brother        SOCIAL HISTORY      Social History     Tobacco History     Smoking Status  Passive Smoke Exposure - Never Smoker    Smokeless Tobacco Use  Never Used          Alcohol History     Alcohol Use Status  Not Asked          Drug Use     Drug Use Status  Not Asked          Sexual Activity     Sexually Active  Not Asked                ALLERGIES     Allergies   Allergen Reactions    No Known Allergies     Tramadol Itching       CURRENT MEDICATIONS      Current Outpatient Medications   Medication Sig Dispense Refill    predniSONE (DELTASONE) 5 MG tablet Take 1 tablet by mouth daily 30 tablet 2    leflunomide (ARAVA) 10 MG tablet Take 1 tablet by mouth daily 30 tablet 0    melatonin 3 MG TABS tablet Take 3 mg by mouth daily      LACTULOSE PO TAKE 15 MILLILITERS BY MOUTH DAILY AS DIRECTED      metoprolol succinate (TOPROL XL) 25 MG extended release tablet Take 25 mg by mouth daily      clopidogrel (PLAVIX) 75 MG tablet Take 75 mg by mouth daily      isosorbide mononitrate (IMDUR) 30 MG extended release tablet Take 30 mg by mouth daily      apixaban (ELIQUIS) 2.5 MG TABS tablet Take 2.5 mg by mouth 2 times daily      furosemide (LASIX) 20 MG tablet Take 20 mg by mouth daily      lisinopril (PRINIVIL;ZESTRIL) 2.5 MG tablet Take 2.5 mg by mouth daily      atorvastatin (LIPITOR) 10 MG tablet Take 10 mg by mouth every evening      pantoprazole (PROTONIX) 40 MG tablet Take 40 mg by mouth daily       No current facility-administered medications for this visit.         PHYSICAL EXAMINATION / OBJECTIVE Objective:  /72 (Site: Right Upper Arm, Position: Sitting, Cuff Size: Medium Adult)   Pulse 50   Temp 97.7 °F (36.5 °C)   Ht 5' 5\" (1.651 m)   Wt 173 lb 0.6 oz (78.5 kg)   SpO2 92%   BMI 28.80 kg/m²     Physical Exam  Vitals signs reviewed. Constitutional:       Appearance: She is well-developed. Neck:      Musculoskeletal: Normal range of motion and neck supple. Cardiovascular:      Rate and Rhythm: Normal rate and regular rhythm. Pulmonary:      Effort: Pulmonary effort is normal.      Breath sounds: Normal breath sounds. Abdominal:      Palpations: Abdomen is soft. Tenderness: There is no abdominal tenderness. Skin:     General: Skin is warm and dry. Findings: No rash. Neurological:      Mental Status: She is alert and oriented to person, place, and time. Deep Tendon Reflexes: Reflexes are normal and symmetric. Psychiatric:         Thought Content: Thought content normal.       Upper extremities:    SHOULDERS + tender bilat  ELBOWS nontender, no swelling,   WRISTS nontender, no swelling,   HANDS/FINGERS + tender bilat MCPs, PIPs, DIPs. + fullnes bilat 2,3 MCPs and PIPs. ?  circumfrential swelling right 2nd finger    Lower extremities:  HIPS + tender bilat outer hips  KNEES + tender bilat, no swelling  ANKLES + tender and swelling bilat, + warmth left   FEET : + MTp compression, posterior heel tenderness/warmth right, + heel spurs     Spine:   + tender lumbar spine      RAPID 3:   3/10/2021 --- RAPID 3: 4.3 + 10 + 8 = 22.3     Remission: <3  Low Disease Activity: <6  Moderate Disease Activity: >=6 and <=12  High Disease Activity: >12     LABS    CBC  Lab Results   Component Value Date    WBC 7.89 01/27/2021    RBC 3.98 01/27/2021    HGB 11.7 01/27/2021    HCT 38.2 01/27/2021    MCV 96.0 01/27/2021    MCH 29.4 01/27/2021    MCHC 30.6 01/27/2021    RDW 13.4 01/27/2021     01/27/2021       CMP  Lab Results   Component Value Date    CALCIUM 8.8 01/27/2021    LABALBU 3.6 01/27/2021     01/27/2021    K 3.8 01/27/2021    CO2 29 01/27/2021     01/27/2021    BUN 42 01/27/2021    CREATININE 1.46 01/27/2021    ALKPHOS 98 01/27/2021    ALT 13 01/27/2021    AST 14 01/27/2021       HgBA1c: No components found for: HGBA1C    No results found for: VITD25      Lab Results   Component Value Date    ANASCRN Positive (A) 02/24/2020     No results found for: SSA  No results found for: SSB  No results found for: ANTI-SMITH  No results found for: DSDNAAB   No results found for: ANTIRNP  No results found for: C3, C4  No results found for: CCPAB  Lab Results   Component Value Date    RF 23.6 01/27/2021       No components found for: CANCASCRN, APANCASCRN  Lab Results   Component Value Date    SEDRATE 29 01/27/2021     Lab Results   Component Value Date    CRP 7.1 01/27/2021       RADIOLOGY:         ASSESSMENT/PLAN    Assessment   Plan     Inflammatory arthritis - concern for rheumatoid arthritis vs SpA (? Dactylitis, posterior heel tenderness, nail thickening, inflammatory arthritis)  H/O rheumatoid arthritis  - diagnosed 3-4 years ago by Dr. Tigist Lipscomb, had bilateral hands and feet swelling which progressed to generalized joint pains. Transferred care to Dr. Chyna Vazquez in [facility]. Currently not taking any medications other than prednisone 5 mg daily. Neg CCP, + RF 1:2 (2018). Exam with tender shoulder, MCPs, PIPs, DIPs, hips, knees, ankles, MTPs. + swelling bilateral ankles. Left posterior heel tenderness. ? circumferential swelling right 2nd finger.   - prior tx: plaquenil, methotrexate (heartburn)                 - start arava 10 mg daily   - start prednisone 5 mg daily after taper     Osteoarthritis of multiple joints, unspecified osteoarthritis type               - continue tylenol PRN     Chronic bilateral low back pain without sciatica  - Denies radicular or red flag symptoms. Worse with activity.  + yoanna testing right side    Medication monitoring   - CBC, CMP, sed rate, CRP q 4 weeks x3 w/ arava start    No follow-ups on file. Electronically signed by AJAY Connolly CNP on 3/10/2021 at 11:47 AM    New Prescriptions    No medications on file       Thank you for allowing me to participate in the care of this patient. Please call if there are any questions. I evaluated the patient prior to bronchoscopy procedure for active pulmonary/laryngeal M. tuberculosis disease and the risk and actions taken:    Low risk with routine standard of care measures followed.